# Patient Record
Sex: MALE | Race: WHITE | NOT HISPANIC OR LATINO | URBAN - METROPOLITAN AREA
[De-identification: names, ages, dates, MRNs, and addresses within clinical notes are randomized per-mention and may not be internally consistent; named-entity substitution may affect disease eponyms.]

---

## 2020-05-11 ENCOUNTER — APPOINTMENT (RX ONLY)
Dept: URBAN - METROPOLITAN AREA CLINIC 26 | Facility: CLINIC | Age: 20
Setting detail: DERMATOLOGY
End: 2020-05-11

## 2020-05-11 ENCOUNTER — APPOINTMENT (RX ONLY)
Dept: URBAN - METROPOLITAN AREA CLINIC 374 | Facility: CLINIC | Age: 20
Setting detail: DERMATOLOGY
End: 2020-05-11

## 2020-05-11 DIAGNOSIS — L70.0 ACNE VULGARIS: ICD-10-CM

## 2020-05-11 DIAGNOSIS — L663 OTHER SPECIFIED DISEASES OF HAIR AND HAIR FOLLICLES: ICD-10-CM | Status: RESOLVED

## 2020-05-11 DIAGNOSIS — B07.8 OTHER VIRAL WARTS: ICD-10-CM

## 2020-05-11 DIAGNOSIS — Q826 OTHER SPECIFIED ANOMALIES OF SKIN: ICD-10-CM

## 2020-05-11 DIAGNOSIS — Q819 OTHER SPECIFIED ANOMALIES OF SKIN: ICD-10-CM

## 2020-05-11 DIAGNOSIS — L738 OTHER SPECIFIED DISEASES OF HAIR AND HAIR FOLLICLES: ICD-10-CM | Status: RESOLVED

## 2020-05-11 DIAGNOSIS — L73.9 FOLLICULAR DISORDER, UNSPECIFIED: ICD-10-CM | Status: RESOLVED

## 2020-05-11 DIAGNOSIS — Q828 OTHER SPECIFIED ANOMALIES OF SKIN: ICD-10-CM

## 2020-05-11 PROBLEM — L02.828 FURUNCLE OF OTHER SITES: Status: ACTIVE | Noted: 2020-05-11

## 2020-05-11 PROBLEM — L85.8 OTHER SPECIFIED EPIDERMAL THICKENING: Status: ACTIVE | Noted: 2020-05-11

## 2020-05-11 PROCEDURE — 17110 DESTRUCTION B9 LES UP TO 14: CPT

## 2020-05-11 PROCEDURE — ? OBSERVATION

## 2020-05-11 PROCEDURE — ? TREATMENT REGIMEN

## 2020-05-11 PROCEDURE — ? ADDITIONAL NOTES

## 2020-05-11 PROCEDURE — ? PRESCRIPTION

## 2020-05-11 PROCEDURE — ? COUNSELING

## 2020-05-11 PROCEDURE — 99202 OFFICE O/P NEW SF 15 MIN: CPT | Mod: 25

## 2020-05-11 PROCEDURE — ? BENIGN DESTRUCTION

## 2020-05-11 RX ORDER — TRETIONIN 0.5 MG/G
CREAM TOPICAL
Qty: 1 | Refills: 3 | Status: ERX | COMMUNITY
Start: 2020-05-11

## 2020-05-11 RX ADMIN — TRETIONIN: 0.5 CREAM TOPICAL at 00:00

## 2020-05-11 ASSESSMENT — LOCATION ZONE DERM
LOCATION ZONE: PENIS
LOCATION ZONE: TOE
LOCATION ZONE: FEET
LOCATION ZONE: FACE

## 2020-05-11 ASSESSMENT — LOCATION DETAILED DESCRIPTION DERM
LOCATION DETAILED: LEFT PLANTAR FOREFOOT OVERLYING 1ST METATARSAL
LOCATION DETAILED: LEFT MEDIAL PLANTAR 2ND TOE
LOCATION DETAILED: LEFT PLANTAR FOREFOOT OVERLYING 4TH METATARSAL
LOCATION DETAILED: RIGHT DORSAL SHAFT OF PENIS
LOCATION DETAILED: RIGHT MEDIAL PLANTAR MIDFOOT
LOCATION DETAILED: LEFT INFERIOR CENTRAL MALAR CHEEK
LOCATION DETAILED: LEFT PLANTAR FOREFOOT OVERLYING 2ND METATARSAL
LOCATION DETAILED: LEFT LATERAL PLANTAR 1ST TOE
LOCATION DETAILED: RIGHT MEDIAL PLANTAR 2ND TOE

## 2020-05-11 ASSESSMENT — LOCATION SIMPLE DESCRIPTION DERM
LOCATION SIMPLE: PLANTAR SURFACE OF LEFT 2ND TOE
LOCATION SIMPLE: PENIS
LOCATION SIMPLE: RIGHT PLANTAR SURFACE
LOCATION SIMPLE: PLANTAR SURFACE OF RIGHT 2ND TOE
LOCATION SIMPLE: LEFT PLANTAR SURFACE
LOCATION SIMPLE: PLANTAR SURFACE OF LEFT 1ST TOE
LOCATION SIMPLE: LEFT CHEEK

## 2020-05-11 NOTE — HPI: SKIN LESION
Is This A New Presentation, Or A Follow-Up?: Skin Lesion
Additional History: Patient states that the lesion does not bother him, he believes it to be an ingrown hair and he is just concerned.

## 2020-05-11 NOTE — PROCEDURE: BENIGN DESTRUCTION
Add 52 Modifier (Optional): no
Medical Necessity Clause: This procedure was medically necessary because the lesions that were treated were:
Medical Necessity Information: It is in your best interest to select a reason for this procedure from the list below. All of these items fulfill various CMS LCD requirements except the new and changing color options.
Anesthesia Volume In Cc: 0.5
Detail Level: Detailed
Post-Care Instructions: I reviewed with the patient in detail post-care instructions. Patient is to wear sunprotection, and avoid picking at any of the treated lesions. Pt may apply Vaseline to crusted or scabbing areas.
Consent: The patient's consent was obtained including but not limited to risks of crusting, scabbing, blistering, scarring, darker or lighter pigmentary change, recurrence, incomplete removal and infection.
Treatment Number (Will Not Render If 0): 0

## 2020-05-11 NOTE — PROCEDURE: COUNSELING
Detail Level: Detailed
Tetracycline Pregnancy And Lactation Text: This medication is Pregnancy Category D and not consider safe during pregnancy. It is also excreted in breast milk.
Sarecycline Counseling: Patient advised regarding possible photosensitivity and discoloration of the teeth, skin, lips, tongue and gums.  Patient instructed to avoid sunlight, if possible.  When exposed to sunlight, patients should wear protective clothing, sunglasses, and sunscreen.  The patient was instructed to call the office immediately if the following severe adverse effects occur:  hearing changes, easy bruising/bleeding, severe headache, or vision changes.  The patient verbalized understanding of the proper use and possible adverse effects of sarecycline.  All of the patient's questions and concerns were addressed.
Detail Level: Zone
Topical Sulfur Applications Counseling: Topical Sulfur Counseling: Patient counseled that this medication may cause skin irritation or allergic reactions.  In the event of skin irritation, the patient was advised to reduce the amount of the drug applied or use it less frequently.   The patient verbalized understanding of the proper use and possible adverse effects of topical sulfur application.  All of the patient's questions and concerns were addressed.
Birth Control Pills Pregnancy And Lactation Text: This medication should be avoided if pregnant and for the first 30 days post-partum.
High Dose Vitamin A Counseling: Side effects reviewed, pt to contact office should one occur.
Topical Clindamycin Pregnancy And Lactation Text: This medication is Pregnancy Category B and is considered safe during pregnancy. It is unknown if it is excreted in breast milk.
Azithromycin Counseling:  I discussed with the patient the risks of azithromycin including but not limited to GI upset, allergic reaction, drug rash, diarrhea, and yeast infections.
Doxycycline Pregnancy And Lactation Text: This medication is Pregnancy Category D and not consider safe during pregnancy. It is also excreted in breast milk but is considered safe for shorter treatment courses.
Topical Retinoid Pregnancy And Lactation Text: This medication is Pregnancy Category C. It is unknown if this medication is excreted in breast milk.
High Dose Vitamin A Pregnancy And Lactation Text: High dose vitamin A therapy is contraindicated during pregnancy and breast feeding.
Topical Sulfur Applications Pregnancy And Lactation Text: This medication is Pregnancy Category C and has an unknown safety profile during pregnancy. It is unknown if this topical medication is excreted in breast milk.
Dapsone Counseling: I discussed with the patient the risks of dapsone including but not limited to hemolytic anemia, agranulocytosis, rashes, methemoglobinemia, kidney failure, peripheral neuropathy, headaches, GI upset, and liver toxicity.  Patients who start dapsone require monitoring including baseline LFTs and weekly CBCs for the first month, then every month thereafter.  The patient verbalized understanding of the proper use and possible adverse effects of dapsone.  All of the patient's questions and concerns were addressed.
Tazorac Counseling:  Patient advised that medication is irritating and drying.  Patient may need to apply sparingly and wash off after an hour before eventually leaving it on overnight.  The patient verbalized understanding of the proper use and possible adverse effects of tazorac.  All of the patient's questions and concerns were addressed.
Erythromycin Counseling:  I discussed with the patient the risks of erythromycin including but not limited to GI upset, allergic reaction, drug rash, diarrhea, increase in liver enzymes, and yeast infections.
Benzoyl Peroxide Counseling: Patient counseled that medicine may cause skin irritation and bleach clothing.  In the event of skin irritation, the patient was advised to reduce the amount of the drug applied or use it less frequently.   The patient verbalized understanding of the proper use and possible adverse effects of benzoyl peroxide.  All of the patient's questions and concerns were addressed.
Azithromycin Pregnancy And Lactation Text: This medication is considered safe during pregnancy and is also secreted in breast milk.
Benzoyl Peroxide Pregnancy And Lactation Text: This medication is Pregnancy Category C. It is unknown if benzoyl peroxide is excreted in breast milk.
Dapsone Pregnancy And Lactation Text: This medication is Pregnancy Category C and is not considered safe during pregnancy or breast feeding.
Bactrim Pregnancy And Lactation Text: This medication is Pregnancy Category D and is known to cause fetal risk.  It is also excreted in breast milk.
Spironolactone Pregnancy And Lactation Text: This medication can cause feminization of the male fetus and should be avoided during pregnancy. The active metabolite is also found in breast milk.
Topical Clindamycin Counseling: Patient counseled that this medication may cause skin irritation or allergic reactions.  In the event of skin irritation, the patient was advised to reduce the amount of the drug applied or use it less frequently.   The patient verbalized understanding of the proper use and possible adverse effects of clindamycin.  All of the patient's questions and concerns were addressed.
Spironolactone Counseling: Patient advised regarding risks of diarrhea, abdominal pain, hyperkalemia, birth defects (for female patients), liver toxicity and renal toxicity. The patient may need blood work to monitor liver and kidney function and potassium levels while on therapy. The patient verbalized understanding of the proper use and possible adverse effects of spironolactone.  All of the patient's questions and concerns were addressed.
Tazorac Pregnancy And Lactation Text: This medication is not safe during pregnancy. It is unknown if this medication is excreted in breast milk.
Bactrim Counseling:  I discussed with the patient the risks of sulfa antibiotics including but not limited to GI upset, allergic reaction, drug rash, diarrhea, dizziness, photosensitivity, and yeast infections.  Rarely, more serious reactions can occur including but not limited to aplastic anemia, agranulocytosis, methemoglobinemia, blood dyscrasias, liver or kidney failure, lung infiltrates or desquamative/blistering drug rashes.
Minocycline Counseling: Patient advised regarding possible photosensitivity and discoloration of the teeth, skin, lips, tongue and gums.  Patient instructed to avoid sunlight, if possible.  When exposed to sunlight, patients should wear protective clothing, sunglasses, and sunscreen.  The patient was instructed to call the office immediately if the following severe adverse effects occur:  hearing changes, easy bruising/bleeding, severe headache, or vision changes.  The patient verbalized understanding of the proper use and possible adverse effects of minocycline.  All of the patient's questions and concerns were addressed.
Erythromycin Pregnancy And Lactation Text: This medication is Pregnancy Category B and is considered safe during pregnancy. It is also excreted in breast milk.
Birth Control Pills Counseling: Birth Control Pill Counseling: I discussed with the patient the potential side effects of OCPs including but not limited to increased risk of stroke, heart attack, thrombophlebitis, deep venous thrombosis, hepatic adenomas, breast changes, GI upset, headaches, and depression.  The patient verbalized understanding of the proper use and possible adverse effects of OCPs. All of the patient's questions and concerns were addressed.
Topical Retinoid counseling:  Patient advised to apply a pea-sized amount only at bedtime and wait 30 minutes after washing their face before applying.  If too drying, patient may add a non-comedogenic moisturizer. The patient verbalized understanding of the proper use and possible adverse effects of retinoids.  All of the patient's questions and concerns were addressed.
Isotretinoin Pregnancy And Lactation Text: This medication is Pregnancy Category X and is considered extremely dangerous during pregnancy. It is unknown if it is excreted in breast milk.
Tetracycline Counseling: Patient counseled regarding possible photosensitivity and increased risk for sunburn.  Patient instructed to avoid sunlight, if possible.  When exposed to sunlight, patients should wear protective clothing, sunglasses, and sunscreen.  The patient was instructed to call the office immediately if the following severe adverse effects occur:  hearing changes, easy bruising/bleeding, severe headache, or vision changes.  The patient verbalized understanding of the proper use and possible adverse effects of tetracycline.  All of the patient's questions and concerns were addressed. Patient understands to avoid pregnancy while on therapy due to potential birth defects.
Isotretinoin Counseling: Patient should get monthly blood tests, not donate blood, not drive at night if vision affected, not share medication, and not undergo elective surgery for 6 months after tx completed. Side effects reviewed, pt to contact office should one occur.
Doxycycline Counseling:  Patient counseled regarding possible photosensitivity and increased risk for sunburn.  Patient instructed to avoid sunlight, if possible.  When exposed to sunlight, patients should wear protective clothing, sunglasses, and sunscreen.  The patient was instructed to call the office immediately if the following severe adverse effects occur:  hearing changes, easy bruising/bleeding, severe headache, or vision changes.  The patient verbalized understanding of the proper use and possible adverse effects of doxycycline.  All of the patient's questions and concerns were addressed.
Use Enhanced Medication Counseling?: No

## 2020-06-08 ENCOUNTER — APPOINTMENT (RX ONLY)
Dept: URBAN - METROPOLITAN AREA CLINIC 26 | Facility: CLINIC | Age: 20
Setting detail: DERMATOLOGY
End: 2020-06-08

## 2020-06-08 DIAGNOSIS — B07.8 OTHER VIRAL WARTS: ICD-10-CM | Status: IMPROVED

## 2020-06-08 PROCEDURE — ? BENIGN DESTRUCTION

## 2020-06-08 PROCEDURE — 17110 DESTRUCTION B9 LES UP TO 14: CPT

## 2020-06-08 PROCEDURE — ? ADDITIONAL NOTES

## 2020-06-08 PROCEDURE — ? COUNSELING

## 2020-06-08 ASSESSMENT — LOCATION SIMPLE DESCRIPTION DERM
LOCATION SIMPLE: RIGHT PLANTAR SURFACE
LOCATION SIMPLE: LEFT PLANTAR SURFACE
LOCATION SIMPLE: PLANTAR SURFACE OF LEFT 2ND TOE
LOCATION SIMPLE: PLANTAR SURFACE OF RIGHT 2ND TOE

## 2020-06-08 ASSESSMENT — LOCATION DETAILED DESCRIPTION DERM
LOCATION DETAILED: LEFT MEDIAL PLANTAR 2ND TOE
LOCATION DETAILED: LEFT PLANTAR FOREFOOT OVERLYING 4TH METATARSAL
LOCATION DETAILED: RIGHT MEDIAL PLANTAR 2ND TOE
LOCATION DETAILED: RIGHT PLANTAR FOREFOOT OVERLYING 1ST METATARSAL
LOCATION DETAILED: LEFT PLANTAR FOREFOOT OVERLYING 2ND METATARSAL
LOCATION DETAILED: RIGHT PLANTAR FOREFOOT OVERLYING 2ND METATARSAL

## 2020-06-08 ASSESSMENT — LOCATION ZONE DERM
LOCATION ZONE: FEET
LOCATION ZONE: TOE

## 2020-06-08 NOTE — PROCEDURE: BENIGN DESTRUCTION
Detail Level: Detailed
Add 52 Modifier (Optional): no
Post-Care Instructions: I reviewed with the patient in detail post-care instructions. Patient is to wear sunprotection, and avoid picking at any of the treated lesions. Pt may apply Vaseline to crusted or scabbing areas.
Treatment Number (Will Not Render If 0): 0
Medical Necessity Information: It is in your best interest to select a reason for this procedure from the list below. All of these items fulfill various CMS LCD requirements except the new and changing color options.
Anesthesia Volume In Cc: 0.5
Medical Necessity Clause: This procedure was medically necessary because the lesions that were treated were:
Consent: The patient's consent was obtained including but not limited to risks of crusting, scabbing, blistering, scarring, darker or lighter pigmentary change, recurrence, incomplete removal and infection.

## 2020-07-27 ENCOUNTER — APPOINTMENT (RX ONLY)
Dept: URBAN - METROPOLITAN AREA CLINIC 26 | Facility: CLINIC | Age: 20
Setting detail: DERMATOLOGY
End: 2020-07-27

## 2020-07-27 DIAGNOSIS — B07.8 OTHER VIRAL WARTS: ICD-10-CM | Status: RESOLVING

## 2020-07-27 PROCEDURE — ? ADDITIONAL NOTES

## 2020-07-27 PROCEDURE — 17110 DESTRUCTION B9 LES UP TO 14: CPT

## 2020-07-27 PROCEDURE — ? COUNSELING

## 2020-07-27 PROCEDURE — ? BENIGN DESTRUCTION

## 2020-07-27 ASSESSMENT — LOCATION ZONE DERM: LOCATION ZONE: TOE

## 2020-07-27 ASSESSMENT — LOCATION SIMPLE DESCRIPTION DERM: LOCATION SIMPLE: PLANTAR SURFACE OF RIGHT 2ND TOE

## 2020-07-27 ASSESSMENT — LOCATION DETAILED DESCRIPTION DERM: LOCATION DETAILED: RIGHT LATERAL PLANTAR 2ND TOE

## 2020-07-27 NOTE — PROCEDURE: ADDITIONAL NOTES
Additional Notes: Patient consent was obtained to proceed with the visit and recommended plan of care after discussion of all risks and benefits, including the risks of COVID-19 exposure.
Detail Level: Simple
Additional Notes: All other verruca clear on exam. R 2nd toe less than 1 mm in size, patient will return for any recurrence, otherwise has completed treatment.

## 2020-07-27 NOTE — PROCEDURE: BENIGN DESTRUCTION
Post-Care Instructions: I reviewed with the patient in detail post-care instructions. Patient is to wear sunprotection, and avoid picking at any of the treated lesions. Pt may apply Vaseline to crusted or scabbing areas.
Render Post-Care Instructions In Note?: no
Consent: The patient's consent was obtained including but not limited to risks of crusting, scabbing, blistering, scarring, darker or lighter pigmentary change, recurrence, incomplete removal and infection.
Detail Level: Detailed
Anesthesia Volume In Cc: 0.5
Medical Necessity Information: It is in your best interest to select a reason for this procedure from the list below. All of these items fulfill various CMS LCD requirements except the new and changing color options.
Medical Necessity Clause: This procedure was medically necessary because the lesions that were treated were:
Treatment Number (Will Not Render If 0): 0

## 2024-02-02 ENCOUNTER — OFFICE VISIT (OUTPATIENT)
Dept: DERMATOLOGY | Facility: CLINIC | Age: 24
End: 2024-02-02
Payer: COMMERCIAL

## 2024-02-02 VITALS — BODY MASS INDEX: 26.11 KG/M2 | WEIGHT: 210 LBS | HEIGHT: 75 IN | TEMPERATURE: 98.4 F

## 2024-02-02 DIAGNOSIS — Z79.899 HIGH RISK MEDICATION USE: ICD-10-CM

## 2024-02-02 DIAGNOSIS — L70.0 ACNE VULGARIS: Primary | ICD-10-CM

## 2024-02-02 DIAGNOSIS — L73.0 ACNE SCARRING: ICD-10-CM

## 2024-02-02 PROCEDURE — 99204 OFFICE O/P NEW MOD 45 MIN: CPT | Performed by: DERMATOLOGY

## 2024-02-02 RX ORDER — ISOTRETINOIN 40 MG/1
40 CAPSULE ORAL DAILY
Qty: 30 CAPSULE | Refills: 0 | Status: SHIPPED | OUTPATIENT
Start: 2024-02-02 | End: 2024-02-02

## 2024-02-02 RX ORDER — DEXTROAMPHETAMINE SACCHARATE, AMPHETAMINE ASPARTATE, DEXTROAMPHETAMINE SULFATE AND AMPHETAMINE SULFATE 2.5; 2.5; 2.5; 2.5 MG/1; MG/1; MG/1; MG/1
10 TABLET ORAL
COMMUNITY

## 2024-02-02 RX ORDER — ISOTRETINOIN 40 MG/1
40 CAPSULE ORAL DAILY
Qty: 30 CAPSULE | Refills: 0 | Status: SHIPPED | OUTPATIENT
Start: 2024-02-02

## 2024-02-02 RX ORDER — ALBUTEROL SULFATE 90 UG/1
2 AEROSOL, METERED RESPIRATORY (INHALATION) EVERY 6 HOURS PRN
COMMUNITY

## 2024-02-02 NOTE — PATIENT INSTRUCTIONS
SYSTEMIC Strategies:      INITIATE ISOTRETINOIN:  MALE       Initiate Isotretinoin (MALE):  High-Risk Medication  Medication Monitoring       ADDITIONAL FAMILY/PERSONAL HISTORY:  Family history of Crohns or Ulcerative Colitis: YES  Family history of high cholesterol or high triglycerides: YES       REVIEW OF SYSTEMS (High-Risk Medication):  Sexually active / Trying to get pregnant:  No  Dry Skin: No  Dry Lips/Eyes: No  Conjunctivitis: No  Difficulty seeing at night / Issues with night vision: No  Focusing Problems: No  Rash: No  Nose Bleeds: No  Angular cheilitis (cracking in corner of lips): No  Headaches: No  Photosensitivity: No  Weight Loss: No  Muscle Aches/Stiffness: No  Abdominal pain: No  Diarrhea: No  Bloody stools: No  Fatigue: No  Mood Changes: No  Depression: No  Suicidal thoughts: No       COUNSELING:  Patient counseled and understands...:  Cannot donate blood while taking isotretinoin and for 1 month after completing therapy. Yes  Do not share medication with anyone. Yes  Avoid excessive protein / creatine intake during isotretinoin therapy. Yes  Avoid rigorous work-outs 1-2 days before any lab work, which can affect liver enzymes.  Yes  Avoid any alcohol intake during isotretinoin therapy. Yes  Should stop all other acne medications unless instructed by Dermatologist otherwise. Yes  Patients counseled that possible side effects discussed, including sun sensitivity, dry lips/eyes/skin, headaches, blurry vision (pseudotumor cerebri), muscle/joint aches, GI upset, bloody stools (“IBD” including Crohn’s/Ulcerative Colitis), jaundice, liver dysfunction, lipid abnormalities, bone marrow dysfunction, mood effects, thoughts of hurting oneself or others, and flaring of acne (acne fulminans/SAPPHO). Yes  Patient understands to report any side effects of mood swings or depression or suicidal thoughts and to stop isotretinoin with any such suspected occurrence. Yes  Patient understands to confirm counseling in  "[employer] computer system prior to picking up prescription from pharmacy. Yes       LAB MONITORING:  Date that labs were last obtained (results may be in \"Labs\" or \"Media\" sections): 8/25/2022  Were any lab results reviewed today?  YES  Any significant lab abnormalities or concerning trends:  No    What is the plan in terms of lab monitoring for NEXT MONTH'S visit?: ordering labs today   What labs are being ordered today?  OTHER:  AST, ALT, and triglycerides  Patient understands to have labs completed as requested by ordering Dermatologist:  Yes         PRESCRIPTION MANAGEMENT:    Patient's current weight (in KILOgrams): 95.3 KILOgrams    \"GOAL DOSE\" (usually ~125 mg x weight in kg but may change on patient-by-patient basis):  11,912.5 mg    Starting \"DAILY DOSE\":: 40 mg ONCE A DAY (equivalent to 40 mg a day)  Patient's i[employer] # has been added to today's isotretinoin prescription:  Yes        [employer] REGISTRATION:  Extensive discussion around side effects and possible adverse events discussed:   Yes  i[employer] handouts provided:   Yes  Email address:  [email or web address]  Full name of parent/legal guardian approving plan (type \"N/A\" if Not Applicable):  N/A  Signed consent scanned into patient's chart:  Yes  Patient preference for receiving messages from [employer]:  Email  Patient REGISTERED today in [employer] today:   Yes  iPL[employer]GE #:  [id number]  i[employer] # has been made as a BLUE sticky note for everyone to see:  Yes         FOLLOW-UP APPOINTMENTS:  Patient has been offered a \"VIRTUAL FOLLOW-UP\" with either the prescribing physician or any attending with a standing virtual clinic (Dr. Kan. Dr. Davis, Dr. Moya):  NO           [email or web address]   MEDICAL DECISION MAKING  Treatment Goal:  Resolution of the CHRONIC condition.       Chronic condition is NOT at treatment goal.  It is progressing along its expected course OR is poorly-controlled.       "

## 2024-02-02 NOTE — PROGRESS NOTES
"[facility] Dermatology Clinic Note     Patient Name: Davi Lewis  Encounter Date: 2/2/24     Have you been cared for by a [facility] Dermatologist in the last 3 years and, if so, which description applies to you?    NO.   I am considered a \"new\" patient and must complete all patient intake questions. I am MALE/not capable of bearing children.    REVIEW OF SYSTEMS:  Have you recently had or currently have any of the following? Recent fever or chills? No  Any non-healing wound? No   PAST MEDICAL HISTORY:  Have you personally ever had or currently have any of the following?  If \"YES,\" then please provide more detail. Skin cancer (such as Melanoma, Basal Cell Carcinoma, Squamous Cell Carcinoma?  No  Tuberculosis, HIV/AIDS, Hepatitis B or C: No  Radiation Treatment No   HISTORY OF IMMUNOSUPPRESSION:   Do you have a history of any of the following:  Systemic Immunosuppression such as Diabetes, Biologic or Immunotherapy, Chemotherapy, Organ Transplantation, Bone Marrow Transplantation?  No     Answering \"YES\" requires the addition of the dotphrase \"IMMUNOSUPPRESSED\" as the first diagnosis of the patient's visit.   FAMILY HISTORY:  Any \"first degree relatives\" (parent, brother, sister, or child) with the following?    Skin Cancer, Pancreatic or Other Cancer? No   PATIENT EXPERIENCE:    Do you want the Dermatologist to perform a COMPLETE skin exam today including a clinical examination under the \"bra and underwear\" areas?  NO  If necessary, do we have your permission to call and leave a detailed message on your Preferred Phone number that includes your specific medical information?  Yes      Not on File No current outpatient medications on file.          Whom besides the patient is providing clinical information about today's encounter?   NO ADDITIONAL HISTORIAN (patient alone provided history)    Physical Exam and Assessment/Plan by Diagnosis:      ACNE VULGARIS    Physical Exam:  Anatomic Locations Involved: Chest and " "Back  Global Assessment: SEVERE:  Entire face is involved, covered with comedones, numerous papules and/or pustules; several nodules and cysts may be present.  Scarring Present? Yes; Atrophic scarring:  MILD      Additional History of Present Condition:  Patient has tried doxycycline in the past and that cleared his skin but then his acne returned. Patient was previously on accutane with good improvement. Last on accutane when he was 14 or 15. Patient has also tried benzoyl peroxide and clindamycin lotion and adapalene without improvement. Patient reports pain with acne on his back.       TODAY'S PLAN:     PRESCRIPTION MANAGEMENT:  Several treatment options were discussed including topical retinoids and their side effects.     Skin Hygiene:      Wash affected areas (face, chest, and back) TWICE A DAY with a mild cleanser such as dove bar sensitive.  Use only mild cleansers (hypoallergenic and without fragrances) and fragrance free detergent (not \"unscented\" products which contain a masking agent); we discussed avoiding irritants/fragranced products.  Apply a good oil-free facial moisturizer AT LEAST TWO TIMES A DAY \" such as cerave or cetaphil.  Minimize the application of oils and cosmetics to the affected skin.  This includes HAIR PRODUCTS such as \"leave in\" conditioners.  Unless the product specifically states that it \"won't cause acne,\" \"won't clog pores,\" and/or \"is non-comedogenic\" then it may actually CAUSE acne.  If you smoke, STOP. Nicotine increases sebum retention and increased scale within the follicles, forming comedones (blackheads and whiteheads).  Abrasive treatments such as dermabrasion and spa facials may aggravate inflammatory acne.  Do NOT scratch or pick your acne bumps.  The evidence that diet directly affects acne remains weak.  However, diet does affect your overall health.  Eat plenty of fresh fruit and vegetables.  Avoid protein or amino acid supplements, particularly if they contain " leucine. Consider a low-glycemic, low-protein and low-dairy diet.  Be mindful that certain medications may cause of aggravate acne.  Make sure to tell your Dermatologist if you start a new prescription, nutritional supplement, and/or herbal remedy.      MORNING Topical Regimen:      NONE.      EVENING Topical Regimen:      NONE.      SYSTEMIC Strategies:      INITIATE ISOTRETINOIN:  MALE       Initiate Isotretinoin (MALE):  High-Risk Medication  Medication Monitoring       ADDITIONAL FAMILY/PERSONAL HISTORY:  Family history of Crohns or Ulcerative Colitis: YES  Family history of high cholesterol or high triglycerides: YES       REVIEW OF SYSTEMS (High-Risk Medication):  Sexually active / Trying to get pregnant:  No  Dry Skin: No  Dry Lips/Eyes: No  Conjunctivitis: No  Difficulty seeing at night / Issues with night vision: No  Focusing Problems: No  Rash: No  Nose Bleeds: No  Angular cheilitis (cracking in corner of lips): No  Headaches: No  Photosensitivity: No  Weight Loss: No  Muscle Aches/Stiffness: No  Abdominal pain: No  Diarrhea: No  Bloody stools: No  Fatigue: No  Mood Changes: No  Depression: No  Suicidal thoughts: No       COUNSELING:  Patient counseled and understands...:  Cannot donate blood while taking isotretinoin and for 1 month after completing therapy. Yes  Do not share medication with anyone. Yes  Avoid excessive protein / creatine intake during isotretinoin therapy. Yes  Avoid rigorous work-outs 1-2 days before any lab work, which can affect liver enzymes.  Yes  Avoid any alcohol intake during isotretinoin therapy. Yes  Should stop all other acne medications unless instructed by Dermatologist otherwise. Yes  Patients counseled that possible side effects discussed, including sun sensitivity, dry lips/eyes/skin, headaches, blurry vision (pseudotumor cerebri), muscle/joint aches, GI upset, bloody stools (“IBD” including Crohn’s/Ulcerative Colitis), jaundice, liver dysfunction, lipid abnormalities,  "bone marrow dysfunction, mood effects, thoughts of hurting oneself or others, and flaring of acne (acne fulminans/SAPPHO). Yes  Patient understands to report any side effects of mood swings or depression or suicidal thoughts and to stop isotretinoin with any such suspected occurrence. Yes  Patient understands to confirm counseling in iPLEDGE computer system prior to picking up prescription from pharmacy. Yes       LAB MONITORING:  Date that labs were last obtained (results may be in \"Labs\" or \"Media\" sections): 8/25/2022  Were any lab results reviewed today?  YES  Any significant lab abnormalities or concerning trends:  No    What is the plan in terms of lab monitoring for NEXT MONTH'S visit?: ordering labs today   What labs are being ordered today?  OTHER:  AST, ALT, and triglycerides  Patient understands to have labs completed as requested by ordering Dermatologist:  Yes         PRESCRIPTION MANAGEMENT:    Patient's current weight (in KILOgrams): 95.3 KILOgrams    \"GOAL DOSE\" (usually ~125 mg x weight in kg but may change on patient-by-patient basis):  11,912.5 mg    Starting \"DAILY DOSE\":: 40 mg ONCE A DAY (equivalent to 40 mg a day)  Patient's iPLEDGE # has been added to today's isotretinoin prescription:  Yes        iPLEDGE REGISTRATION:  Extensive discussion around side effects and possible adverse events discussed:   Yes  iPLEDGE handouts provided:   Yes  Email address:  [email or web address]  Full name of parent/legal guardian approving plan (type \"N/A\" if Not Applicable):  N/A  Signed consent scanned into patient's chart:  Yes  Patient preference for receiving messages from iPLEDGE:  Email  Patient REGISTERED today in iPLEDGE today:   Yes  iPLEDGE #:  [id number]  iPLEDGE # has been made as a BLUE sticky note for everyone to see:  Yes         FOLLOW-UP APPOINTMENTS:  Patient has been offered a \"VIRTUAL FOLLOW-UP\" with either the prescribing physician or any attending with a standing virtual clinic (Dr." Lucius. Dr. Davis, Dr. Moya):  YES           [email or web address]        Chronic condition is NOT at treatment goal.  It is progressing along its expected course OR is poorly-controlled.              Scribe Attestation      I,:  Selena Baez am acting as a scribe while in the presence of the attending physician.:       I,:  Maty Leon MD personally performed the services described in this documentation    as scribed in my presence.:

## 2024-02-08 LAB
ALT SERPL-CCNC: 28 IU/L (ref 0–44)
AST SERPL-CCNC: 24 IU/L (ref 0–40)
TRIGL SERPL-MCNC: 132 MG/DL (ref 0–149)

## 2024-03-05 ENCOUNTER — TELEMEDICINE (OUTPATIENT)
Dept: DERMATOLOGY | Facility: CLINIC | Age: 24
End: 2024-03-05
Payer: COMMERCIAL

## 2024-03-05 VITALS — WEIGHT: 205 LBS | BODY MASS INDEX: 25.49 KG/M2 | HEIGHT: 75 IN

## 2024-03-05 DIAGNOSIS — L70.0 ACNE VULGARIS: Primary | ICD-10-CM

## 2024-03-05 DIAGNOSIS — Z51.81 MEDICATION MONITORING ENCOUNTER: ICD-10-CM

## 2024-03-05 DIAGNOSIS — Z79.899 HIGH RISK MEDICATION USE: ICD-10-CM

## 2024-03-05 PROCEDURE — 99214 OFFICE O/P EST MOD 30 MIN: CPT | Performed by: STUDENT IN AN ORGANIZED HEALTH CARE EDUCATION/TRAINING PROGRAM

## 2024-03-05 RX ORDER — ISOTRETINOIN 40 MG/1
CAPSULE ORAL
Qty: 60 CAPSULE | Refills: 0 | Status: SHIPPED | OUTPATIENT
Start: 2024-03-05

## 2024-03-05 NOTE — PROGRESS NOTES
"[facility] Dermatology VIRTUAL Note     Patient Name: Davi Lewis  Encounter Date: 3/5/24     Have you been cared for by a [facility] Dermatologist in the last 3 years and, if so, which description applies to you?    Yes.  I have been here within the last 3 years, and my medical history has NOT changed since that time.  I am MALE/not capable of bearing children.    REVIEW OF SYSTEMS:  Have you recently had or currently have any of the following? No changes in my recent health.   PAST MEDICAL HISTORY:  Have you personally ever had or currently have any of the following?  If \"YES,\" then please provide more detail. No changes in my medical history.   HISTORY OF IMMUNOSUPPRESSION: Do you have a history of any of the following:  Systemic Immunosuppression such as Diabetes, Biologic or Immunotherapy, Chemotherapy, Organ Transplantation, Bone Marrow Transplantation?  No     Answering \"YES\" requires the addition of the dotphrase \"IMMUNOSUPPRESSED\" as the first diagnosis of the patient's visit.   FAMILY HISTORY:  Any \"first degree relatives\" (parent, brother, sister, or child) with the following?    No changes in my family's known health.   PATIENT EXPERIENCE:    Do you want the Dermatologist to perform a COMPLETE skin exam today including a clinical examination under the \"bra and underwear\" areas?  NO  If necessary, do we have your permission to call and leave a detailed message on your Preferred Phone number that includes your specific medical information?  Yes      Allergies   Allergen Reactions    Cephalosporins Anaphylaxis      Current Outpatient Medications:     albuterol (PROVENTIL HFA,VENTOLIN HFA) 90 mcg/act inhaler, Inhale 2 puffs every 6 (six) hours as needed for wheezing, Disp: , Rfl:     amphetamine-dextroamphetamine (ADDERALL) 10 mg tablet, Take 10 mg by mouth 2 (two) times a day, Disp: , Rfl:     ISOtretinoin (ACCUTANE) 40 MG capsule, Take 1 capsule (40 mg total) by mouth daily, Disp: 30 capsule, Rfl: 0    " "      Whom besides the patient is providing clinical information about today's encounter?   NO ADDITIONAL HISTORIAN (patient alone provided history)    Physical Exam and Assessment/Plan by Diagnosis:      ISOTRETINOIN USE  ACNE VULGARIS (\"COMMON ACNE\")    Physical Exam:  Anatomic Location Affected:   Face, chest, back  Morphological Description: Open and closed comedones, erythematous papules, pustules, nodules, erythema - improved from prior  Pertinent Positives:  Pertinent Negatives:    Additional History of Present Condition:      Ipledge number: [id number]       DURATION OF TREATMENT:30 day  CURRENT DOSE: 40 mg daily  WEIGHT: 93 kg  CUMULATIVE DOSE (MG):1,200              CUMULATIVE DOSE (MG/KG):1,200  GOAL DOSE (160 mg/kg): 14,880 mg      Adverse effects:  CHEILITIS: Yes - mild and tolerable  FACIAL DRYNESS: YES - mild and tolerable  EPISTAXIS: None  MOOD CHANGE: None  HEADACHES: None  VISUAL Sx: None  MUSCULOSKELETAL Sx: None  RASH: None    CLINICAL COURSE: Somewhat improved        LABS  Baseline labs WNL (CBC, AST/ALT, Triglycerides)  Repeat labs to be performed before next appointment after achieving peak dose WNL (AST/ALT, Triglycerides)  Additional laboratory testing is not warranted when these tests are WNL and no additional testing is recommended based on recent guidelines ([facility], Isotretinoin Laboratory Monitoring in Acne Treatment: A [place] Consensus Study). Additional studies have shown that most lab abnormalities in isotretinoin users arise by month two and this laboratory monitoring serves to identify rare cases of lab abnormalities that warrant cessation or reduction in treatment (PMID: 21196039)    DIAGNOSES:    Acne Vulgaris  High Risk Medication - on Accutane therapy   Xerosis (see below for patient education)    Assessment and Plan:    Acne vulgaris, on isotretinoin   Planned daily dose for next 30 days: 40 mg BID  - Confirmed patient status and counseling in iPLEDGE today  - Gentle " skin care and regular SPF use. Stop all other acne treatments while on isotretinoin   - Recommend daily emollient for lips   - Side effects including but not limited to serious allergic reaction, thrombosis, mood changes, skeletal hyperostosis, premature epiphyseal closure, IBD, bone and muscle pain, HA, hyperlipidemia, photosensitivity, hepatotoxicity, dry skin and eyes, hair loss, teratogenicity (pregnancy category X) risk for up to 1 month after stopping medicine, 20-30% risk of recurrence were reviewed.  - The patient understands not to donate blood, drink excessive amounts of alcohol, or share the medication with any individuals   Follow up in 1 month      Patients counseled:  Cannot donate blood while taking isotretinoin and for 1 month after completing therapy. YES  Do not share medication with anyone. YES  Report any side effects of mood swings or depression and stop medication upon occurrence. YES  Patient needs to confirm counseling in iPledge prior to picking up prescription. YES     Virtual Regular Visit    Verification of patient location:    Patient is located at Home in the following state in which I hold an active license [place]      Assessment/Plan:    Problem List Items Addressed This Visit    None  Visit Diagnoses       Acne vulgaris    -  Primary    High risk medication use        Medication monitoring encounter                     Reason for visit is   Chief Complaint   Patient presents with    Virtual Regular Visit          Encounter provider Ambrosio Kan MD    Provider located at DERMATOLOGY [address] [place] [address] [phone]      Recent Visits  No visits were found meeting these conditions.  Showing recent visits within past 7 days and meeting all other requirements  Today's Visits  Date Type Provider Dept   03/05/24 Telemedicine Ambrosio Kan MD [facility]   Showing today's visits and  "meeting all other requirements  Future Appointments  No visits were found meeting these conditions.  Showing future appointments within next 150 days and meeting all other requirements       The patient was identified by name and date of birth. Davi Lewis was informed that this is a telemedicine visit and that the visit is being conducted through the Epic Embedded platform. He agrees to proceed..  My office door was closed. No one else was in the room.  He acknowledged consent and understanding of privacy and security of the video platform. The patient has agreed to participate and understands they can discontinue the visit at any time.    Patient is aware this is a billable service.     Subjective  Davi Lewis is a 23 y.o. male presenting for accutane follow up .      HPI     Past Medical History:   Diagnosis Date    Asthma        History reviewed. No pertinent surgical history.    Current Outpatient Medications   Medication Sig Dispense Refill    albuterol (PROVENTIL HFA,VENTOLIN HFA) 90 mcg/act inhaler Inhale 2 puffs every 6 (six) hours as needed for wheezing      amphetamine-dextroamphetamine (ADDERALL) 10 mg tablet Take 10 mg by mouth 2 (two) times a day      ISOtretinoin (ACCUTANE) 40 MG capsule Take 1 capsule (40 mg total) by mouth daily 30 capsule 0     No current facility-administered medications for this visit.        Allergies   Allergen Reactions    Cephalosporins Anaphylaxis       Review of Systems    Video Exam    Vitals:    03/05/24 1321   Weight: 93 kg (205 lb)   Height: 6' 3\" (1.905 m)       Physical Exam     Visit Time  Total Visit Duration: 15 mins        "

## 2024-04-02 LAB
ALT SERPL-CCNC: 29 IU/L (ref 0–44)
AST SERPL-CCNC: 29 IU/L (ref 0–40)
TRIGL SERPL-MCNC: 141 MG/DL (ref 0–149)

## 2024-04-09 ENCOUNTER — TELEMEDICINE (OUTPATIENT)
Dept: DERMATOLOGY | Facility: CLINIC | Age: 24
End: 2024-04-09
Payer: COMMERCIAL

## 2024-04-09 DIAGNOSIS — Z79.899 HIGH RISK MEDICATION USE: Primary | ICD-10-CM

## 2024-04-09 DIAGNOSIS — L70.0 ACNE VULGARIS: ICD-10-CM

## 2024-04-09 DIAGNOSIS — Z51.81 MEDICATION MONITORING ENCOUNTER: ICD-10-CM

## 2024-04-09 PROCEDURE — 99214 OFFICE O/P EST MOD 30 MIN: CPT | Performed by: STUDENT IN AN ORGANIZED HEALTH CARE EDUCATION/TRAINING PROGRAM

## 2024-04-09 RX ORDER — ISOTRETINOIN 40 MG/1
CAPSULE ORAL
Qty: 60 CAPSULE | Refills: 0 | Status: SHIPPED | OUTPATIENT
Start: 2024-04-09

## 2024-04-09 NOTE — PROGRESS NOTES
"[facility] Note     Patient Name: Davi Lewis  Encounter Date: 4/9/2024     Have you been cared for by a [facility]ologist in the last 3 years and, if so, which description applies to you?    Yes.  I have been here within the last 3 years, and my medical history has NOT changed since that time.  I am FEMALE/of child-bearing potential.    REVIEW OF SYSTEMS:  Have you recently had or currently have any of the following? No changes in my recent health.   PAST MEDICAL HISTORY:  Have you personally ever had or currently have any of the following?  If \"YES,\" then please provide more detail. No changes in my medical history.   HISTORY OF IMMUNOSUPPRESSION: Do you have a history of any of the following:  Systemic Immunosuppression such as Diabetes, Biologic or Immunotherapy, Chemotherapy, Organ Transplantation, Bone Marrow Transplantation?  No     Answering \"YES\" requires the addition of the dotphrase \"IMMUNOSUPPRESSED\" as the first diagnosis of the patient's visit.   FAMILY HISTORY:  Any \"first degree relatives\" (parent, brother, sister, or child) with the following?    No changes in my family's known health.   PATIENT EXPERIENCE:    Do you want the Dermatologist to perform a COMPLETE skin exam today including a clinical examination under the \"bra and underwear\" areas?  NO  If necessary, do we have your permission to call and leave a detailed message on your Preferred Phone number that includes your specific medical information?  Yes      Allergies   Allergen Reactions    Cephalosporins Anaphylaxis      Current Outpatient Medications:     albuterol (PROVENTIL HFA,VENTOLIN HFA) 90 mcg/act inhaler, Inhale 2 puffs every 6 (six) hours as needed for wheezing, Disp: , Rfl:     amphetamine-dextroamphetamine (ADDERALL) 10 mg tablet, Take 10 mg by mouth 2 (two) times a day, Disp: , Rfl:     ISOtretinoin (ACCUTANE) 40 MG capsule, Take one capsule (40 mg) twice daily (80 mg daily) with fatty food., Disp: " "60 capsule, Rfl: 0          Whom besides the patient is providing clinical information about today's encounter?   NO ADDITIONAL HISTORIAN (patient alone provided history)    Physical Exam and Assessment/Plan by Diagnosis:      ISOTRETINOIN \"ACCUTANE\": MALE    Age:23 y.o.  Weight: 93 Kg  Pulse: Virtual   Blood Pressure: Virtual     Ipledge number:  [id number]         \"Goal Dose\" in mg (125 mg x weight in kg): 14,880 mg    Interim month  \"Daily Dose\" of Isotretinoin the patient has actually been taking since last visit (this is usually what was prescribed): 80 mg (40 mg twice a day)   \"Total # of Days\" patient took Isotretinoin since last visit (this is usually 30):  30 days  \"Total Monthly Dose\" in mg since last visit (this equals \"Daily Dose\" x \"Total # of Days\"): 2,400 mg    Cumulative dosage  \"Total cumulative Dose\" in mg (add the above \"Total Monthly Dose\" with \"Total Cumulative Dose\" from last visit: 3600 mg        Symptoms  Conjunctivitis: No  Night Blindness/ Issues with night vision: No  Focusing Problems: No  Dry Lips/Eyes: YES, Lips   Dry Skin: YES  Rash: YES - consistent with prior eczema that is currently mildly exacerbated   Nose Bleeds: No  Angular cheilitis (cracking in corner of lips): No  Headaches: No  Photosensitivity: No  Dry Anus: No  Depression: No  Mood Changes: No  Suicidal thoughts: No  Fatigue: No  Weight Loss: No  Muscle Pain/Stiffness: YES, Muscle stiffness ( Back stiffness) - notes present prior to accutane  Abdominal pain: No  Diarrhea: No  Bloody stools: No         Physical Exam  Psychiatric/Mood: Pleasant   Anatomic Location Affected:  Face, chest and back   Morphological Description:  Open/Closed Comedones:  Few (\"Mild\")  Inflammatory Papules/Pustules:  Few (\"Mild\")  Nodules:  Rare (\"Almost Clear\")  Scarring:  Rare (\"Almost Clear\")  Excoriations:  No evidence (\"Clear\")  Local Skin Redness/Erythema:  No evidence (\"Clear\")  Local Skin Dryness/Scaling:  Few (\"Mild\")  Local Skin " "Dyspigmentation:  Rare (\"Almost Clear\")  Pertinent Positives:  Pertinent Negatives:      Labs  Date of last labs: 4/1/2024  Completed: YES  Labs reviewed: within normal limits      Additional History of Present Condition:  He is on Accutane. Entering his 3rd month of treatment. He is currently prescribed Accutane 40 mg twice a day. He is progressing positively with only minor side effects        DIAGNOSES:    Acne Vulgaris  High Risk Medication  Medication Monitoring  Xerosis (see below for patient education)    Assessment and Plan:    Let us know if your back worsens.  To address the dry skin on his arms, we can prescribe Triamcinolone 0.1% ointment to apply twice a day for 14 days period. He wants to hold off.  Suggested If the dry skin (rash) worsens, to give us a call, and we can arrange a prescription. He wants to continue to manage it with Vaseline. He states that occasionally he uses hydrocortisone as needed.   Follow up in 4 weeks    Target Total Dose per KILOgram:  125 mg/KILOgram (this may change this on a patient-by-patient basis)  Planned daily dose for next 30 days: 80 mg   Please remember to add patient's \"Ipledge number\" to actual prescription):    Return to clinic in about 1 month, please review ipledge guidelines in terms of timing (see below for patient education)  Get labs 1-2 days before next appointment: No - no additional labs needed. Obtained labs after reaching goal dose.   Patient confirmed in iPledge: YES  Patients counseled:  Cannot donate blood while taking isotretinoin and for 1 month after completing therapy. YES  Do not share medication with anyone. YES  Possible side effects discussed, including sun sensitivity, dry lips/eyes/skin, headaches, blurry vision (pseudotumor cerebri), muscle/joint aches, GI upset, bloody stools (“IBD” including Crohn’s/Ulcerative Colitis), jaundice, liver dysfunction, lipid abnormalities, bone marrow dysfunction, mood effects, thoughts of hurting oneself or " others, and flaring of acne (acne fulminans/SAPPHO). YES  Report any side effects of mood swings or depression and stop medication upon occurrence. YES    Virtual Regular Visit    Verification of patient location:    Patient is located at Home in the following state in which I hold an active license [place]      Assessment/Plan:    Problem List Items Addressed This Visit    None  Visit Diagnoses       Acne vulgaris                     Reason for visit is   Chief Complaint   Patient presents with    Virtual Regular Visit          Encounter provider Ambrosio Kan MD    Provider located at [address] [place] [address] [phone]      Recent Visits  No visits were found meeting these conditions.  Showing recent visits within past 7 days and meeting all other requirements  Today's Visits  Date Type Provider Dept   04/09/24 Telemedicine Ambrosio Kan MD [facility]   Showing today's visits and meeting all other requirements  Future Appointments  No visits were found meeting these conditions.  Showing future appointments within next 150 days and meeting all other requirements       The patient was identified by name and date of birth. Davi Lewis was informed that this is a telemedicine visit and that the visit is being conducted through the Epic Embedded platform. He agrees to proceed..  My office door was closed. The patient was notified the following individuals were present in the room Paula.  He acknowledged consent and understanding of privacy and security of the video platform. The patient has agreed to participate and understands they can discontinue the visit at any time.    Patient is aware this is a billable service.     Subjective  Davi Lewis is a 23 y.o. male who has a follow up appointment for Accutane treatment.       HPI     Past Medical History:   Diagnosis Date    Asthma        History  reviewed. No pertinent surgical history.    Current Outpatient Medications   Medication Sig Dispense Refill    albuterol (PROVENTIL HFA,VENTOLIN HFA) 90 mcg/act inhaler Inhale 2 puffs every 6 (six) hours as needed for wheezing      amphetamine-dextroamphetamine (ADDERALL) 10 mg tablet Take 10 mg by mouth 2 (two) times a day      ISOtretinoin (ACCUTANE) 40 MG capsule Take one capsule (40 mg) twice daily (80 mg daily) with fatty food. 60 capsule 0     No current facility-administered medications for this visit.        Allergies   Allergen Reactions    Cephalosporins Anaphylaxis       Review of Systems    Video Exam    There were no vitals filed for this visit.    Physical Exam     Visit Time  Total Visit Duration: 10 minutes

## 2024-05-14 ENCOUNTER — TELEMEDICINE (OUTPATIENT)
Dept: DERMATOLOGY | Facility: CLINIC | Age: 24
End: 2024-05-14
Payer: COMMERCIAL

## 2024-05-14 ENCOUNTER — TELEPHONE (OUTPATIENT)
Age: 24
End: 2024-05-14

## 2024-05-14 VITALS — HEIGHT: 75 IN | BODY MASS INDEX: 26.73 KG/M2 | WEIGHT: 215 LBS

## 2024-05-14 DIAGNOSIS — L73.0 ACNE SCARRING: ICD-10-CM

## 2024-05-14 DIAGNOSIS — L70.0 ACNE VULGARIS: Primary | ICD-10-CM

## 2024-05-14 DIAGNOSIS — Z51.81 MEDICATION MONITORING ENCOUNTER: ICD-10-CM

## 2024-05-14 DIAGNOSIS — Z79.899 HIGH RISK MEDICATION USE: ICD-10-CM

## 2024-05-14 PROCEDURE — 99214 OFFICE O/P EST MOD 30 MIN: CPT | Performed by: STUDENT IN AN ORGANIZED HEALTH CARE EDUCATION/TRAINING PROGRAM

## 2024-05-14 RX ORDER — ISOTRETINOIN 40 MG/1
CAPSULE ORAL
Qty: 60 CAPSULE | Refills: 0 | Status: SHIPPED | OUTPATIENT
Start: 2024-05-14

## 2024-05-14 NOTE — PROGRESS NOTES
Virtual Regular Visit    Verification of patient location:    Patient is located at Home in the following state in which I hold an active license [place]      Assessment/Plan:    Problem List Items Addressed This Visit    None           Reason for visit is   Chief Complaint   Patient presents with    Virtual Regular Visit          Encounter provider Ambrosio Kan MD      Recent Visits  No visits were found meeting these conditions.  Showing recent visits within past 7 days and meeting all other requirements  Today's Visits  Date Type Provider Dept   05/14/24 Telemedicine Ambrosio Kan MD [facility]   Showing today's visits and meeting all other requirements  Future Appointments  No visits were found meeting these conditions.  Showing future appointments within next 150 days and meeting all other requirements       The patient was identified by name and date of birth. Davi Lewis was informed that this is a telemedicine visit and that the visit is being conducted through the Jascha platform. He agrees to proceed..  My office door was closed. No one else was in the room.  He acknowledged consent and understanding of privacy and security of the video platform. The patient has agreed to participate and understands they can discontinue the visit at any time.    Patient is aware this is a billable service.     Subjective  Davi Lewis is a 23 y.o. male  .      HPI     Past Medical History:   Diagnosis Date    Asthma        No past surgical history on file.    Current Outpatient Medications   Medication Sig Dispense Refill    albuterol (PROVENTIL HFA,VENTOLIN HFA) 90 mcg/act inhaler Inhale 2 puffs every 6 (six) hours as needed for wheezing      amphetamine-dextroamphetamine (ADDERALL) 10 mg tablet Take 10 mg by mouth 2 (two) times a day      ISOtretinoin (ACCUTANE) 40 MG capsule Take one capsule (40 mg) twice daily (80 mg daily) with fatty food. 60 capsule 0     No current  "facility-administered medications for this visit.        Allergies   Allergen Reactions    Cephalosporins Anaphylaxis       Review of Systems    Video Exam    There were no vitals filed for this visit.    Physical Exam     Visit Time  Total Visit Duration: 15      [facility] Note     Patient Name: Davi Lewis  Encounter Date: 5/14/24     Have you been cared for by a [facility]atologist in the last 3 years and, if so, which description applies to you?    Yes.  I have been here within the last 3 years, and my medical history has NOT changed since that time.  I am MALE/not capable of bearing children.    REVIEW OF SYSTEMS:  Have you recently had or currently have any of the following? No changes in my recent health.   PAST MEDICAL HISTORY:  Have you personally ever had or currently have any of the following?  If \"YES,\" then please provide more detail. No changes in my medical history.   HISTORY OF IMMUNOSUPPRESSION: Do you have a history of any of the following:  Systemic Immunosuppression such as Diabetes, Biologic or Immunotherapy, Chemotherapy, Organ Transplantation, Bone Marrow Transplantation?  No     Answering \"YES\" requires the addition of the dotphrase \"IMMUNOSUPPRESSED\" as the first diagnosis of the patient's visit.   FAMILY HISTORY:  Any \"first degree relatives\" (parent, brother, sister, or child) with the following?    No changes in my family's known health.   PATIENT EXPERIENCE:    Do you want the Dermatologist to perform a COMPLETE skin exam today including a clinical examination under the \"bra and underwear\" areas?  NO  If necessary, do we have your permission to call and leave a detailed message on your Preferred Phone number that includes your specific medical information?  Yes      Allergies   Allergen Reactions    Cephalosporins Anaphylaxis      Current Outpatient Medications:     albuterol (PROVENTIL HFA,VENTOLIN HFA) 90 mcg/act inhaler, Inhale 2 puffs every 6 (six) hours " "as needed for wheezing, Disp: , Rfl:     amphetamine-dextroamphetamine (ADDERALL) 10 mg tablet, Take 10 mg by mouth 2 (two) times a day, Disp: , Rfl:     ISOtretinoin (ACCUTANE) 40 MG capsule, Take one capsule (40 mg) twice daily (80 mg daily) with fatty food., Disp: 60 capsule, Rfl: 0          Whom besides the patient is providing clinical information about today's encounter?   NO ADDITIONAL HISTORIAN (patient alone provided history)    Physical Exam and Assessment/Plan by Diagnosis:  ISOTRETINOIN \"ACCUTANE\": MALE     Age:23 y.o.  Weight: 97.5 Kg  Pulse: Virtual   Blood Pressure: Virtual      Ipledge number:  [id number]            \"Goal Dose\" in mg (160 mg x weight in kg): 15,600 mg     Interim month  \"Daily Dose\" of Isotretinoin the patient has actually been taking since last visit (this is usually what was prescribed): 80 mg (40 mg twice a day)   \"Total # of Days\" patient took Isotretinoin since last visit (this is usually 30):  30  \"Total Monthly Dose\" in mg since last visit (this equals \"Daily Dose\" x \"Total # of Days\"): 2,400     Cumulative dosage  \"Total cumulative Dose\" in mg (add the above \"Total Monthly Dose\" with \"Total Cumulative Dose\" from last visit: 6000 mg           Symptoms  Conjunctivitis: No  Night Blindness/ Issues with night vision: No  Focusing Problems: No  Dry Lips/Eyes: YES, Lips   Dry Skin: YES  Rash: YES - consistent with prior eczema and improved from prior  Nose Bleeds: Yes - minor and not bothersome  Angular cheilitis (cracking in corner of lips): No  Headaches: No  Photosensitivity: No  Dry Anus: No  Depression: No  Mood Changes: No  Suicidal thoughts: No  Fatigue: No  Weight Loss: No  Muscle Pain/Stiffness: No  Abdominal pain: No  Diarrhea: No  Bloody stools: No                       Physical Exam  Psychiatric/Mood: Pleasant   Anatomic Location Affected:  Face, chest and back   Morphological Description:  Open/Closed Comedones:  Few (\"Mild\")  Inflammatory Papules/Pustules:  Few " "(\"Mild\")  Nodules:  Rare (\"Almost Clear\")  Scarring:  Rare (\"Almost Clear\")  Excoriations:  No evidence (\"Clear\")  Local Skin Redness/Erythema:  No evidence (\"Clear\")  Local Skin Dryness/Scaling:  Few (\"Mild\")  Local Skin Dyspigmentation:  Rare (\"Almost Clear\")  Pertinent Positives:  Pertinent Negatives:        Labs  Date of last labs: 4/1/2024  Completed: YES  Labs reviewed: within normal limits        Additional History of Present Condition:  He is on Accutane.  He is currently prescribed Accutane 40 mg twice a day. He is progressing positively with only minor side effects. Still with flares primarily on back; however overall improved.        DIAGNOSES:       Acne Vulgaris  High Risk Medication  Medication Monitoring  Xerosis (see below for patient education)     Assessment and Plan:     To address the dry skin on his arms, we can prescribe Triamcinolone 0.1% ointment to apply twice a day for 14 days period. He wants to hold off.  Suggested If the dry skin (rash) worsens, to give us a call, and we can arrange a prescription. He wants to continue to manage it with Vaseline. He states that occasionally he uses hydrocortisone as needed.   Follow up in 4 weeks     Target Total Dose per KILOgram:  160 mg/KILOgram (this may change this on a patient-by-patient basis)  Planned daily dose for next 30 days: 40 mg BID   Please remember to add patient's \"Ipledge number\" to actual prescription):    Return to clinic in about 1 month, please review ipledge guidelines in terms of timing (see below for patient education)  Get labs 1-2 days before next appointment: No - no additional labs needed. Obtained labs after reaching goal dose.   Patient confirmed in iPledge: YES  Patients counseled:  Cannot donate blood while taking isotretinoin and for 1 month after completing therapy. YES  Do not share medication with anyone. YES  Possible side effects discussed, including sun sensitivity, dry lips/eyes/skin, headaches, blurry vision " (pseudotumor cerebri), muscle/joint aches, GI upset, bloody stools (“IBD” including Crohn’s/Ulcerative Colitis), jaundice, liver dysfunction, lipid abnormalities, bone marrow dysfunction, mood effects, thoughts of hurting oneself or others, and flaring of acne (acne fulminans/SAPPHO). YES  Report any side effects of mood swings or depression and stop medication upon occurrence. YES     Scribe Attestation      I,:  Misty Malhotra MA am acting as a scribe while in the presence of the attending physician.:       I,:  Ambrosio Kan MD personally performed the services described in this documentation    as scribed in my presence.:

## 2024-05-14 NOTE — TELEPHONE ENCOUNTER
Patient called bc he is waiting for his virtual appt and the call keeps failing.  He said he received the link and is waiting. I notified office he is waiting for dr pation

## 2024-06-18 ENCOUNTER — TELEMEDICINE (OUTPATIENT)
Dept: DERMATOLOGY | Facility: CLINIC | Age: 24
End: 2024-06-18
Payer: COMMERCIAL

## 2024-06-18 VITALS — BODY MASS INDEX: 26.87 KG/M2 | WEIGHT: 215 LBS

## 2024-06-18 DIAGNOSIS — Z51.81 MEDICATION MONITORING ENCOUNTER: ICD-10-CM

## 2024-06-18 DIAGNOSIS — L70.0 ACNE VULGARIS: Primary | ICD-10-CM

## 2024-06-18 DIAGNOSIS — Z79.899 HIGH RISK MEDICATION USE: ICD-10-CM

## 2024-06-18 DIAGNOSIS — L73.0 ACNE SCARRING: ICD-10-CM

## 2024-06-18 PROCEDURE — 99214 OFFICE O/P EST MOD 30 MIN: CPT | Performed by: STUDENT IN AN ORGANIZED HEALTH CARE EDUCATION/TRAINING PROGRAM

## 2024-06-18 RX ORDER — ISOTRETINOIN 40 MG/1
CAPSULE ORAL
Qty: 60 CAPSULE | Refills: 0 | Status: SHIPPED | OUTPATIENT
Start: 2024-06-18

## 2024-06-18 NOTE — PROGRESS NOTES
Virtual Regular Visit    Verification of patient location:    Patient is located at Home in the following state in which I hold an active license [place]      Assessment/Plan:    Problem List Items Addressed This Visit    None  Visit Diagnoses       Acne vulgaris    -  Primary    High risk medication use        Medication monitoring encounter        Acne scarring                     Reason for visit is   Chief Complaint   Patient presents with    Virtual Regular Visit          Encounter provider Ambrosio Kan MD      Recent Visits  No visits were found meeting these conditions.  Showing recent visits within past 7 days and meeting all other requirements  Today's Visits  Date Type Provider Dept   06/18/24 Telemedicine Ambrosio Kan MD  Dermatology [facility]   Showing today's visits and meeting all other requirements  Future Appointments  No visits were found meeting these conditions.  Showing future appointments within next 150 days and meeting all other requirements       The patient was identified by name and date of birth. Davi Lewis was informed that this is a telemedicine visit and that the visit is being conducted through the Epic Embedded platform. He agrees to proceed..  My office door was closed. No one else was in the room.  He acknowledged consent and understanding of privacy and security of the video platform. The patient has agreed to participate and understands they can discontinue the visit at any time.    Patient is aware this is a billable service.     Subjective  Davi Lewis is a 23 y.o. male  .      HPI     Past Medical History:   Diagnosis Date    Acne     Asthma        History reviewed. No pertinent surgical history.    Current Outpatient Medications   Medication Sig Dispense Refill    albuterol (PROVENTIL HFA,VENTOLIN HFA) 90 mcg/act inhaler Inhale 2 puffs every 6 (six) hours as needed for wheezing      amphetamine-dextroamphetamine (ADDERALL) 10 mg tablet Take 10 mg by  "mouth 2 (two) times a day      ISOtretinoin (ACCUTANE) 40 MG capsule Take one capsule (40 mg) twice daily (80 mg daily) with fatty food. 60 capsule 0     No current facility-administered medications for this visit.        Allergies   Allergen Reactions    Cephalosporins Anaphylaxis       Review of Systems    Video Exam    Vitals:    06/18/24 1316   Weight: 97.5 kg (215 lb)       Physical Exam     Visit Time  Total Visit Duration: 15 mins        ISOTRETINOIN \"ACCUTANE\": MALE    Age:23 y.o.  Weight: 97.5 kg    Ipledge number:  [id number]       \"Goal Dose\" in mg (125 mg x weight in kg): 15,600  mg    Interim month  \"Daily Dose\" of Isotretinoin the patient has actually been taking since last visit (this is usually what was prescribed): 40 mg ( 40 mg twice daily =80 mg)  \"Total # of Days\" patient took Isotretinoin since last visit (this is usually 30):  30  days  \"Total Monthly Dose\" in mg since last visit (this equals \"Daily Dose\" x \"Total # of Days\"): 2,400 mg    Cumulative dosage  \"Total cumulative Dose\" in mg (add the above \"Total Monthly Dose\" with \"Total Cumulative Dose\" from last visit: 8,400 mg      Symptoms  Conjunctivitis: No  Night Blindness/ Issues with night vision: No  Focusing Problems: No  Dry Lips/Eyes: YES  Dry Skin: YES  Rash: YES, apply hydrocortisone with good control  Nose Bleeds: YES, sometimes but manageable  Angular cheilitis (cracking in corner of lips): No  Headaches: No  Photosensitivity: No  Dry Anus: No  Depression: No  Mood Changes: No  Suicidal thoughts: No  Fatigue: No  Weight Loss: No  Muscle Pain/Stiffness: YES, lower back pain that is mildly uncomfortable but does not impact day to day life  Abdominal pain: No  Diarrhea: No  Bloody stools: No      Note patient experiencing tinnitus. Only after a loud concert. There have been reports of tinnitus with accutane but given timing after concert think low concern. Patient to call if not improving.      Physical Exam  Psychiatric/Mood: " "normal  Anatomic Location Affected:  chest and back  Morphological Description:  Open/Closed Comedones:  Rare (\"Almost Clear\")  Inflammatory Papules/Pustules:  Rare (\"Almost Clear\")  Nodules:  No evidence (\"Clear\")  Scarring:  Rare (\"Almost Clear\")  Excoriations:  No evidence (\"Clear\")  Local Skin Redness/Erythema:  Rare (\"Almost Clear\")  Local Skin Dryness/Scaling:  Rare (\"Almost Clear\")  Local Skin Dyspigmentation:  Rare (\"Almost Clear\")  Pertinent Positives:  Pertinent Negatives:      Labs  Date of last labs: 4/1/24  Completed: YES  Labs reviewed: within normal limits. Obtained after 30 days at max dose.      DIAGNOSES:    Acne Vulgaris  High Risk Medication  Medication Monitoring  Xerosis (see below for patient education)    Assessment and Plan:  Target Total Dose per KILOgram:  125 mg/KILOgram (this may change this on a patient-by-patient basis)  Planned daily dose for next 30 days: 40 mg BID   Please remember to add patient's \"Ipledge number\" to actual prescription):    Return to clinic in about 1 month, please review ipledge guidelines in terms of timing (see below for patient education)  Get labs 1-2 days before next appointment: YES  Patient confirmed in iPledge: YES  Patients counseled:  Cannot donate blood while taking isotretinoin and for 1 month after completing therapy. YES  Do not share medication with anyone. YES  Possible side effects discussed, including sun sensitivity, dry lips/eyes/skin, headaches, blurry vision (pseudotumor cerebri), muscle/joint aches, GI upset, bloody stools (“IBD” including Crohn’s/Ulcerative Colitis), jaundice, liver dysfunction, lipid abnormalities, bone marrow dysfunction, mood effects, thoughts of hurting oneself or others, and flaring of acne (acne fulminans/SAPPHO). YES  Report any side effects of mood swings or depression and stop medication upon occurrence. YES      ORAL ISOTRETINOIN (used to be called the brand name “ACCUTANE”)  Isotretinoin, a derivative of vitamin " A, is a powerful drug with several significant potential side effects. It is reserved for acne which is severe or when other medications have not worked well enough.  It used to be sold under the brand name “Accutane” but now several versions exist.    Isotretinoin for Acne   Isotretinoin, a derivative of vitamin A, is a retinoid medication that is taken by mouth to treat severe nodular acne. Typically, it is used once other acne treatments have not worked, such as oral antibiotics. Isotretinoin is powerful drug with several significant potential side effects. It used to be sold under the brand name “Accutane” but now several versions exist. Usually isotretinoin is taken for 4 to 6 months, although the length of treatment can vary from person to person.  While most patient’s acne improves and may even clear with this medication, in 20% of patients acne can come back. This requires additional acne treatment or even a second cycle of isotretinoin.     How should I take isotretinoin?   Isotretinoin dosing is weight-based and should be taken exactly as prescribed.    If you miss a dose, skip that dose. Do not take 2 doses at the same time.    Take with food to help with absorption.  All instructions in the iPLEDGE program packet (www.[employer]) that was provided must be followed (see below for highlights).   You will get a 30 day supply of isotretinoin at a time. It cannot be automatically refilled.  Make certain that you have been given enough medication to last 30 days as pharmacists are unable to refill or make changes within a month.  You must return to your dermatologist every month to make sure you are not having any serious side effects from isotretinoin. For female patients, this visit will always include a monthly pregnancy test. Other laboratory studies, including liver function tests, cholesterol and triglycerides, must also be conducted before and during treatment.     What should I avoid while  taking isotretinoin?   Do not donate blood while take isotretinoin or until 1 months after coming off the medication.   Do not have cosmetic procedures to smooth your skin, including waxing, dermabrasion, or laser procedures, while taking this medication and for at least 6 months after you stop.    Do not share isotretinoin with any other people. It can cause birth defects and other serious health problems.   Do not use any other acne medications, including medicated washes, cleansers, creams or antibiotic pills during your treatment with isotretinoin unless expressly directed to by your dermatologist.     Initiating isotretinoin & the iPLEDGE Program   The iPLEDGE Program is a strict, government-required program to prevent females from becoming pregnant while on isotretinoin. All females and males must participate. Note: Your provider must follow this program and cannot change any of the requirements.   Before starting isotretinoin, your provider will talk to you about the safe use of this medication and you will need to sign consent forms in order to receive treatment.    If you fail to keep appointments, you will be unable to get your prescription filled.     For male patients and women of non-childbearing age: There is no waiting period. Once laboratory tests are done, treatment can start.   For more information, visit: [email or web address]    What are the possible side effects of isotretinoin? What should I do?   Most side effects from isotretinoin are mild and can be easily improved with simple remedies.  Others are more concerning.   Dry skin and eyes, chapped lips and dry nose that may lead to nosebleeds.    Dry Skin: Apply sensitive skin moisturizers to dry skin at least two times a day. You may need sunscreen (SPF 30) in the morning and to reapply when outside.    Dry Eyes: Use saline eye drops or artificial tears.    Dry Nose/Nosebleeds: Use saline nasal spray (ex. Ayr)  during the day and at bedtime. To stop nosebleeds, hold pressure.  If this does not work, call your dermatologist.    Chapped lips: apply petrolatum-based lip balms routinely. Avoid anything “medicated.” Contact your dermatologist for excessive dryness, cracks, tenderness or pain.   Increased blood fats and cholesterol (usually in patients with a personal or family history of cholesterol or triglyceride problems).    Vision problems affecting your ability to see in the dark and drive at night.   Bone, muscle and tendon aches can occur. Additional stretching before and after activities may help relieve aches.  If you are otherwise healthy, consider the use of ibuprofen or naproxen.  If you are unsure if you can use these pain medications, ask your doctor first. Also, call your doctor if you experience severe back pain, joint pain, or a broken bone   Changes in mood, including anxiety, depressive symptoms or suicidal thoughts which may or may not be temporary.  Notify your doctor if your or a family member have suffered from these conditions or if you have any concerns during treatment.   Serious birth defects or miscarriage can occur while taking this medication and for one month after taking your last dose of isotretinoin. You must not get pregnant while taking isotretinoin. Once the medication is out of your system, 30 days, there is no effect on the baby.   Increased pressure in the brain. Call your doctor right away if you experience bad headache, blurred vision, dizziness, nausea or vomiting, or seizures.   Skin rash - call your doctor right away if you develop any rashes or blisters on the skin.   Liver damage - call your doctor right away if you experience severe stomach, chest or bowel pain, painful swallowing, diarrhea, blood in your stool, yellowing of your skin or eyes, or dark urine.   Gastrointestinal bleeding. If you experience unusual abdominal pain or red or black/tarry stools, call your doctor  "immediately. You should also notify your doctor if you or a family member has a history of ulcerative colitis or Crohns disease.   Worsening acne. Mild worsening of acne can occur in the first few weeks of using isotreinoin. If your acne is getting significantly worse, call your doctor. This may require temporarily stopping the isotretinoin and possibly adding other medications.           XEROSIS (\"DRY SKIN\")    Dry skin refers to skin that feels dry to touch. Dry skin has a dull surface with a rough, scaly quality. The skin is less pliable and cracked. When dryness is severe, the skin may become inflamed and fissured.  Although any body site can be dry, dry skin tends to affect the shins more than any other site.    Dry skin is lacking moisture in the outer horny cell layer (stratum corneum) and this results in cracks in the skin surface.  Dry skin is also called xerosis, xeroderma or asteatosis (lack of fat).  It can affect males and females of all ages. There is some racial variability in water and lipid content of the skin.  Dry skin that starts in early childhood may be one of about 20 types of ichthyosis (fish-scale skin). There is often a family history of dry skin.   Dry skin is commonly seen in people with atopic dermatitis.  Nearly everyone > 60 years has dry skin.    Dry skin that begins later may be seen in people with certain diseases and conditions.  Postmenopausal women  Hypothyroidism  Chronic renal disease   Malnutrition and weight loss   Subclinical dermatitis   Treatment with certain drugs such as oral retinoids, diuretics and epidermal growth factor receptor inhibitors    People exposed to a dry environment may experience dry skin.  Low humidity: in desert climates or cool, windy conditions   Excessive air conditioning   Direct heat from a fire or fan heater   Excessive bathing   Contact with soap, detergents and solvents   Inappropriate topical agents such as alcohol   Frictional irritation from " rough clothing or abrasives    Dry skin is due to abnormalities in the integrity of the barrier function of the stratum corneum, which is made up of corneocytes.  There is an overall reduction in the lipids in the stratum corneum.   Ratio of ceramides, cholesterol and free fatty acids may be normal or altered.   There may be a reduction in the proliferation of keratinocytes.   Keratinocyte subtypes change in dry skin with a decrease in keratins K1, K10 and increase in K5, K14.   Involucrin (a protein) may be expressed early, increasing cell stiffness.   The result is retention of corneocytes and reduced water-holding capacity.    What is the treatment for dry skin?  The mainstay of treatment of dry skin and ichthyosis is moisturisers/emollients. They should be applied liberally and often enough to:  Reduce itch   Improve the barrier function   Prevent entry of irritants, bacteria   Reduce transepidermal water loss.    When considering which emollient is most suitable, consider:  Severity of the dryness   Tolerance   Personal preference   Cost and availability.    Emollients generally work best if applied to damp skin, if pH is below 7 (acidic), and if containing humectants such as urea or propylene glycol.  Additional treatments include:  Topical steroid if itchy or there is dermatitis -- choose an emollient base   Topical calcineurin inhibitors if topical steroids are unsuitable.    How can dry skin be prevented?  Eliminate aggravating factors:  Reduce the frequency of bathing.   A humidifier in winter and air conditioner in summer   Compare having a short shower with a prolonged soak in a bath.   Use lukewarm, not hot, water.   Replace standard soap with a substitute such as a synthetic detergent cleanser, water-miscible emollient, bath oil, anti-pruritic tar oil, colloidal oatmeal etc.   Apply an emollient liberally and often, particularly shortly after bathing, and when itchy. The drier the skin, the thicker  this should be, especially on the hands.    What is the outlook for dry skin?  A tendency to dry skin may persist life-long, or it may improve once contributing factors are controlled.     Scribe Attestation      I,:  Akilah Magallanes am acting as a scribe while in the presence of the attending physician.:       I,:  Ambrosio Kan MD personally performed the services described in this documentation    as scribed in my presence.:

## 2024-07-23 ENCOUNTER — TELEMEDICINE (OUTPATIENT)
Dept: DERMATOLOGY | Facility: CLINIC | Age: 24
End: 2024-07-23
Payer: COMMERCIAL

## 2024-07-23 VITALS — HEIGHT: 75 IN | BODY MASS INDEX: 26.73 KG/M2 | WEIGHT: 215 LBS

## 2024-07-23 DIAGNOSIS — L70.0 ACNE VULGARIS: Primary | ICD-10-CM

## 2024-07-23 DIAGNOSIS — H93.19 TINNITUS, UNSPECIFIED LATERALITY: ICD-10-CM

## 2024-07-23 PROCEDURE — 99214 OFFICE O/P EST MOD 30 MIN: CPT | Performed by: STUDENT IN AN ORGANIZED HEALTH CARE EDUCATION/TRAINING PROGRAM

## 2024-07-23 RX ORDER — ISOTRETINOIN 40 MG/1
CAPSULE ORAL
Qty: 60 CAPSULE | Refills: 0 | Status: SHIPPED | OUTPATIENT
Start: 2024-07-23

## 2024-07-23 NOTE — PROGRESS NOTES
"[facility] Dermatology Clinic Note     Patient Name: Davi Lewis  Encounter Date: 07/23/24     Have you been cared for by a [facility] Dermatologist in the last 3 years and, if so, which description applies to you?    Yes.  I have been here within the last 3 years, and my medical history has NOT changed since that time.  I am MALE/not capable of bearing children.    REVIEW OF SYSTEMS:  Have you recently had or currently have any of the following? No changes in my recent health.   PAST MEDICAL HISTORY:  Have you personally ever had or currently have any of the following?  If \"YES,\" then please provide more detail. No changes in my medical history.   HISTORY OF IMMUNOSUPPRESSION: Do you have a history of any of the following:  Systemic Immunosuppression such as Diabetes, Biologic or Immunotherapy, Chemotherapy, Organ Transplantation, Bone Marrow Transplantation?  No     Answering \"YES\" requires the addition of the dotphrase \"IMMUNOSUPPRESSED\" as the first diagnosis of the patient's visit.   FAMILY HISTORY:  Any \"first degree relatives\" (parent, brother, sister, or child) with the following?    No changes in my family's known health.   PATIENT EXPERIENCE:    Do you want the Dermatologist to perform a COMPLETE skin exam today including a clinical examination under the \"bra and underwear\" areas?  NO  If necessary, do we have your permission to call and leave a detailed message on your Preferred Phone number that includes your specific medical information?  NO      Allergies   Allergen Reactions    Cephalosporins Anaphylaxis      Current Outpatient Medications:     albuterol (PROVENTIL HFA,VENTOLIN HFA) 90 mcg/act inhaler, Inhale 2 puffs every 6 (six) hours as needed for wheezing, Disp: , Rfl:     amphetamine-dextroamphetamine (ADDERALL) 10 mg tablet, Take 10 mg by mouth 2 (two) times a day, Disp: , Rfl:     ISOtretinoin (ACCUTANE) 40 MG capsule, Take one capsule (40 mg) twice daily (80 mg daily) with fatty food., " "Disp: 60 capsule, Rfl: 0          Whom besides the patient is providing clinical information about today's encounter?   NO ADDITIONAL HISTORIAN (patient alone provided history)    Physical Exam and Assessment/Plan by Diagnosis:       Virtual Regular Visit  Name: Davi Lewis      : 2000      MRN: [id number]  Encounter Provider: Ambrosio Kan MD  Encounter Date: 2024   Encounter department: [facility] DERMATOLOGY [place]    Verification of patient location:    Patient is located at Home in the following state in which I hold an active license [place]    Assessment & Plan         Encounter provider Ambrosio Kan MD    The patient was identified by name and date of birth. Davi Lewis was informed that this is a telemedicine visit and that the visit is being conducted through the [employer] platform. He agrees to proceed..  My office door was closed. No one else was in the room.  He acknowledged consent and understanding of privacy and security of the video platform. The patient has agreed to participate and understands they can discontinue the visit at any time.    Patient is aware this is a billable service.     History of Present Illness     Davi Lewis is a 23 y.o. male who presents for accutane folllow up    Review of Systems    Objective     Ht 6' 3\" (1.905 m)   Wt 97.5 kg (215 lb)   BMI 26.87 kg/m²   Physical Exam    Visit Time  Total Visit Duration: 15 mins    ISOTRETINOIN \"ACCUTANE\": MALE     Age:23 y.o.  Weight: 97.5 kg     Ipledge number:  [id number]         \"Goal Dose\" in mg (125 mg x weight in kg): 15,600  mg     Interim month  \"Daily Dose\" of Isotretinoin the patient has actually been taking since last visit (this is usually what was prescribed): 40 mg ( 40 mg twice daily =80 mg)  \"Total # of Days\" patient took Isotretinoin since last visit (this is usually 30):  30  days  \"Total Monthly Dose\" in mg since last visit (this equals \"Daily Dose\" x \"Total # of " "Days\"): 2,400 mg     Cumulative dosage  \"Total cumulative Dose\" in mg (add the above \"Total Monthly Dose\" with \"Total Cumulative Dose\" from last visit: 10,800 mg        Symptoms  Conjunctivitis: No  Night Blindness/ Issues with night vision: No  Focusing Problems: No  Dry Lips/Eyes: YES  Dry Skin: YES  Rash: YES on palms, apply hydrocortisone with good control  Nose Bleeds: YES, sometimes but manageable and very rare  Angular cheilitis (cracking in corner of lips): No  Headaches: No  Photosensitivity: No  Dry Anus: No  Depression: No  Mood Changes: No  Suicidal thoughts: No  Fatigue: No  Weight Loss: No  Muscle Pain/Stiffness: NO  Abdominal pain: No  Diarrhea: No  Bloody stools: No        Note patient experiencing tinnitus. Only after a loud concert. There have been reports of tinnitus with accutane but given timing after concert think low concern. Been persistent but not worsening. Low concern for accutane side effect but referring to ENT for evaluation. Literature suggests any hearing side effects from accutane are generally reversible on cessation; however will be thorough and cautious                   Physical Exam  Psychiatric/Mood: normal  Anatomic Location Affected:  chest and back  Morphological Description:  Open/Closed Comedones:  Rare (\"Almost Clear\")  Inflammatory Papules/Pustules:  Rare (\"Almost Clear\")  Nodules:  No evidence (\"Clear\")  Scarring:  Rare (\"Almost Clear\")  Excoriations:  No evidence (\"Clear\")  Local Skin Redness/Erythema:  Rare (\"Almost Clear\")  Local Skin Dryness/Scaling:  Rare (\"Almost Clear\")  Local Skin Dyspigmentation:  Rare (\"Almost Clear\")  Pertinent Positives:  Pertinent Negatives:        Labs  Date of last labs: 4/1/24  Completed: YES  Labs reviewed: within normal limits. Obtained after 30 days at max dose.        DIAGNOSES:       Acne Vulgaris  High Risk Medication  Medication Monitoring  Xerosis (see below for patient education)     Assessment and Plan:  Target Total Dose per " "KILOgram:  125 mg/KILOgram (this may change this on a patient-by-patient basis)  Planned daily dose for next 30 days: 40 mg BID   Please remember to add patient's \"Ipledge number\" to actual prescription):    Return to clinic in about 1 month, please review ipledge guidelines in terms of timing (see below for patient education)  Get labs 1-2 days before next appointment: NO  Patient confirmed in iPledge: YES  Patients counseled:  Cannot donate blood while taking isotretinoin and for 1 month after completing therapy. YES  Do not share medication with anyone. YES  Possible side effects discussed, including sun sensitivity, dry lips/eyes/skin, headaches, blurry vision (pseudotumor cerebri), muscle/joint aches, GI upset, bloody stools (“IBD” including Crohn’s/Ulcerative Colitis), jaundice, liver dysfunction, lipid abnormalities, bone marrow dysfunction, mood effects, thoughts of hurting oneself or others, and flaring of acne (acne fulminans/SAPPHO). YES  Report any side effects of mood swings or depression and stop medication upon occurrence. YES        ORAL ISOTRETINOIN (used to be called the brand name “ACCUTANE”)  Isotretinoin, a derivative of vitamin A, is a powerful drug with several significant potential side effects. It is reserved for acne which is severe or when other medications have not worked well enough.  It used to be sold under the brand name “Accutane” but now several versions exist.     Isotretinoin for Acne   Isotretinoin, a derivative of vitamin A, is a retinoid medication that is taken by mouth to treat severe nodular acne. Typically, it is used once other acne treatments have not worked, such as oral antibiotics. Isotretinoin is powerful drug with several significant potential side effects. It used to be sold under the brand name “Accutane” but now several versions exist. Usually isotretinoin is taken for 4 to 6 months, although the length of treatment can vary from person to person.  While most " patient’s acne improves and may even clear with this medication, in 20% of patients acne can come back. This requires additional acne treatment or even a second cycle of isotretinoin.      How should I take isotretinoin?   Isotretinoin dosing is weight-based and should be taken exactly as prescribed.    If you miss a dose, skip that dose. Do not take 2 doses at the same time.    Take with food to help with absorption.  All instructions in the iPLEDGE program packet (www.[employer]) that was provided must be followed (see below for highlights).   You will get a 30 day supply of isotretinoin at a time. It cannot be automatically refilled.  Make certain that you have been given enough medication to last 30 days as pharmacists are unable to refill or make changes within a month.  You must return to your dermatologist every month to make sure you are not having any serious side effects from isotretinoin. For female patients, this visit will always include a monthly pregnancy test. Other laboratory studies, including liver function tests, cholesterol and triglycerides, must also be conducted before and during treatment.      What should I avoid while taking isotretinoin?   Do not donate blood while take isotretinoin or until 1 months after coming off the medication.   Do not have cosmetic procedures to smooth your skin, including waxing, dermabrasion, or laser procedures, while taking this medication and for at least 6 months after you stop.    Do not share isotretinoin with any other people. It can cause birth defects and other serious health problems.   Do not use any other acne medications, including medicated washes, cleansers, creams or antibiotic pills during your treatment with isotretinoin unless expressly directed to by your dermatologist.      Initiating isotretinoin & the iPLEDGE Program   The iPLEDGE Program is a strict, government-required program to prevent females from becoming pregnant while on  isotretinoin. All females and males must participate. Note: Your provider must follow this program and cannot change any of the requirements.   Before starting isotretinoin, your provider will talk to you about the safe use of this medication and you will need to sign consent forms in order to receive treatment.    If you fail to keep appointments, you will be unable to get your prescription filled.     For male patients and women of non-childbearing age: There is no waiting period. Once laboratory tests are done, treatment can start.   For more information, visit: [email or web address]     What are the possible side effects of isotretinoin? What should I do?   Most side effects from isotretinoin are mild and can be easily improved with simple remedies.  Others are more concerning.   Dry skin and eyes, chapped lips and dry nose that may lead to nosebleeds.    Dry Skin: Apply sensitive skin moisturizers to dry skin at least two times a day. You may need sunscreen (SPF 30) in the morning and to reapply when outside.    Dry Eyes: Use saline eye drops or artificial tears.    Dry Nose/Nosebleeds: Use saline nasal spray (ex. Ayr) during the day and at bedtime. To stop nosebleeds, hold pressure.  If this does not work, call your dermatologist.    Chapped lips: apply petrolatum-based lip balms routinely. Avoid anything “medicated.” Contact your dermatologist for excessive dryness, cracks, tenderness or pain.   Increased blood fats and cholesterol (usually in patients with a personal or family history of cholesterol or triglyceride problems).    Vision problems affecting your ability to see in the dark and drive at night.   Bone, muscle and tendon aches can occur. Additional stretching before and after activities may help relieve aches.  If you are otherwise healthy, consider the use of ibuprofen or naproxen.  If you are unsure if you can use these pain medications, ask your doctor first.  "Also, call your doctor if you experience severe back pain, joint pain, or a broken bone   Changes in mood, including anxiety, depressive symptoms or suicidal thoughts which may or may not be temporary.  Notify your doctor if your or a family member have suffered from these conditions or if you have any concerns during treatment.   Serious birth defects or miscarriage can occur while taking this medication and for one month after taking your last dose of isotretinoin. You must not get pregnant while taking isotretinoin. Once the medication is out of your system, 30 days, there is no effect on the baby.   Increased pressure in the brain. Call your doctor right away if you experience bad headache, blurred vision, dizziness, nausea or vomiting, or seizures.   Skin rash - call your doctor right away if you develop any rashes or blisters on the skin.   Liver damage - call your doctor right away if you experience severe stomach, chest or bowel pain, painful swallowing, diarrhea, blood in your stool, yellowing of your skin or eyes, or dark urine.   Gastrointestinal bleeding. If you experience unusual abdominal pain or red or black/tarry stools, call your doctor immediately. You should also notify your doctor if you or a family member has a history of ulcerative colitis or Crohns disease.   Worsening acne. Mild worsening of acne can occur in the first few weeks of using isotreinoin. If your acne is getting significantly worse, call your doctor. This may require temporarily stopping the isotretinoin and possibly adding other medications.              XEROSIS (\"DRY SKIN\")     Dry skin refers to skin that feels dry to touch. Dry skin has a dull surface with a rough, scaly quality. The skin is less pliable and cracked. When dryness is severe, the skin may become inflamed and fissured.  Although any body site can be dry, dry skin tends to affect the shins more than any other site.     Dry skin is lacking moisture in the outer " horny cell layer (stratum corneum) and this results in cracks in the skin surface.  Dry skin is also called xerosis, xeroderma or asteatosis (lack of fat).  It can affect males and females of all ages. There is some racial variability in water and lipid content of the skin.  Dry skin that starts in early childhood may be one of about 20 types of ichthyosis (fish-scale skin). There is often a family history of dry skin.   Dry skin is commonly seen in people with atopic dermatitis.  Nearly everyone > 60 years has dry skin.     Dry skin that begins later may be seen in people with certain diseases and conditions.  Postmenopausal women  Hypothyroidism  Chronic renal disease   Malnutrition and weight loss   Subclinical dermatitis   Treatment with certain drugs such as oral retinoids, diuretics and epidermal growth factor receptor inhibitors     People exposed to a dry environment may experience dry skin.  Low humidity: in desert climates or cool, windy conditions   Excessive air conditioning   Direct heat from a fire or fan heater   Excessive bathing   Contact with soap, detergents and solvents   Inappropriate topical agents such as alcohol   Frictional irritation from rough clothing or abrasives     Dry skin is due to abnormalities in the integrity of the barrier function of the stratum corneum, which is made up of corneocytes.  There is an overall reduction in the lipids in the stratum corneum.   Ratio of ceramides, cholesterol and free fatty acids may be normal or altered.   There may be a reduction in the proliferation of keratinocytes.   Keratinocyte subtypes change in dry skin with a decrease in keratins K1, K10 and increase in K5, K14.   Involucrin (a protein) may be expressed early, increasing cell stiffness.   The result is retention of corneocytes and reduced water-holding capacity.     What is the treatment for dry skin?  The mainstay of treatment of dry skin and ichthyosis is moisturisers/emollients. They  should be applied liberally and often enough to:  Reduce itch   Improve the barrier function   Prevent entry of irritants, bacteria   Reduce transepidermal water loss.     When considering which emollient is most suitable, consider:  Severity of the dryness   Tolerance   Personal preference   Cost and availability.     Emollients generally work best if applied to damp skin, if pH is below 7 (acidic), and if containing humectants such as urea or propylene glycol.  Additional treatments include:  Topical steroid if itchy or there is dermatitis -- choose an emollient base   Topical calcineurin inhibitors if topical steroids are unsuitable.     How can dry skin be prevented?  Eliminate aggravating factors:  Reduce the frequency of bathing.   A humidifier in winter and air conditioner in summer   Compare having a short shower with a prolonged soak in a bath.   Use lukewarm, not hot, water.   Replace standard soap with a substitute such as a synthetic detergent cleanser, water-miscible emollient, bath oil, anti-pruritic tar oil, colloidal oatmeal etc.   Apply an emollient liberally and often, particularly shortly after bathing, and when itchy. The drier the skin, the thicker this should be, especially on the hands.     What is the outlook for dry skin?  A tendency to dry skin may persist life-long, or it may improve once contributing factors are controlled.

## 2024-08-27 ENCOUNTER — TELEMEDICINE (OUTPATIENT)
Dept: DERMATOLOGY | Facility: CLINIC | Age: 24
End: 2024-08-27
Payer: COMMERCIAL

## 2024-08-27 DIAGNOSIS — L70.0 ACNE VULGARIS: Primary | ICD-10-CM

## 2024-08-27 DIAGNOSIS — Z79.899 ON ISOTRETINOIN THERAPY: ICD-10-CM

## 2024-08-27 DIAGNOSIS — Z79.899 HIGH RISK MEDICATION USE: ICD-10-CM

## 2024-08-27 DIAGNOSIS — L85.3 XEROSIS OF SKIN: ICD-10-CM

## 2024-08-27 PROCEDURE — 99214 OFFICE O/P EST MOD 30 MIN: CPT | Performed by: STUDENT IN AN ORGANIZED HEALTH CARE EDUCATION/TRAINING PROGRAM

## 2024-08-27 RX ORDER — ISOTRETINOIN 40 MG/1
CAPSULE ORAL
Qty: 60 CAPSULE | Refills: 0 | Status: SHIPPED | OUTPATIENT
Start: 2024-08-27

## 2024-08-27 NOTE — PROGRESS NOTES
"Virtual Regular Visit  Name: Davi Lewis      : 2000      MRN: [id number]  Encounter Provider: Ambrosio Kan MD  Encounter Date: 2024   Encounter department: [facility] [place]    Verification of patient location:    Patient is located at Home in the following state in which I hold an active license [place]    Assessment & Plan         Encounter provider Ambrosio Kan MD    The patient was identified by name and date of birth. Davi Lewis was informed that this is a telemedicine visit and that the visit is being conducted through the [employer] platform. He agrees to proceed..  My office door was closed. No one else was in the room.  He acknowledged consent and understanding of privacy and security of the video platform. The patient has agreed to participate and understands they can discontinue the visit at any time.    Patient is aware this is a billable service.     History of Present Illness     Davi Lewis is a 23 y.o. male who presents for Accutane.    Review of Systems    Objective     There were no vitals taken for this visit.  Physical Exam    Visit Time  Total Visit Duration: 15 mins    [facility] Dermatology Clinic Note     Patient Name: Davi Lewis  Encounter Date: 2024     Have you been cared for by a [facility] Dermatologist in the last 3 years and, if so, which description applies to you?    Yes.  I have been here within the last 3 years, and my medical history has NOT changed since that time.  I am MALE/not capable of bearing children.    REVIEW OF SYSTEMS:  Have you recently had or currently have any of the following? No changes in my recent health.   PAST MEDICAL HISTORY:  Have you personally ever had or currently have any of the following?  If \"YES,\" then please provide more detail. No changes in my medical history.   HISTORY OF IMMUNOSUPPRESSION: Do you have a history of any of the following:  Systemic Immunosuppression such as " "Diabetes, Biologic or Immunotherapy, Chemotherapy, Organ Transplantation, Bone Marrow Transplantation or Prednisone?  No     Answering \"YES\" requires the addition of the dotphrase \"IMMUNOSUPPRESSED\" as the first diagnosis of the patient's visit.   FAMILY HISTORY:  Any \"first degree relatives\" (parent, brother, sister, or child) with the following?    No changes in my family's known health.   PATIENT EXPERIENCE:    Do you want the Dermatologist to perform a COMPLETE skin exam today including a clinical examination under the \"bra and underwear\" areas?  NO  If necessary, do we have your permission to call and leave a detailed message on your Preferred Phone number that includes your specific medical information?  Yes      Allergies   Allergen Reactions    Cephalosporins Anaphylaxis      Current Outpatient Medications:     albuterol (PROVENTIL HFA,VENTOLIN HFA) 90 mcg/act inhaler, Inhale 2 puffs every 6 (six) hours as needed for wheezing, Disp: , Rfl:     amphetamine-dextroamphetamine (ADDERALL) 10 mg tablet, Take 10 mg by mouth 2 (two) times a day, Disp: , Rfl:     ISOtretinoin (ACCUTANE) 40 MG capsule, Take one capsule (40 mg) twice daily (80 mg daily) with fatty food., Disp: 60 capsule, Rfl: 0          Whom besides the patient is providing clinical information about today's encounter?   NO ADDITIONAL HISTORIAN (patient alone provided history)    Physical Exam and Assessment/Plan by Diagnosis:    ISOTRETINOIN USE  ACNE VULGARIS (\"COMMON ACNE\")    Physical Exam:  Anatomic Location Affected:  Face, chest, back  Morphological Description: Previously with open and closed comedones, erythematous papules, pustules, nodules, erythema. Today largely clear. Rare erythematous papules per patient   Pertinent Positives:  Pertinent Negatives:    Additional History of Present Condition:      Ipledge number: [id number]    DURATION OF TREATMENT: 30 days  CURRENT DOSE: 40 mg BID  WEIGHT: 97.5 kg, starting weight 93 kg  CUMULATIVE DOSE " (MG): 2,400 mg              CUMULATIVE DOSE (MG/KG): 13,200 mg   GOAL DOSE (160 mg/kg):  15,600 mg, starting goal dose 14,880 mg    Symptoms  Conjunctivitis: No  Night Blindness/ Issues with night vision: No  Focusing Problems: No  Dry Lips/Eyes: YES, dry lips uses Aquaphor and dry eyes when windy  Dry Skin: YES, CeraVe  Rash: No, hands rash has resolved  Nose Bleeds: YES, when picking dry nasal discharge  Angular cheilitis (cracking in corner of lips): No  Headaches: No  Photosensitivity: YES, slightly but tolerable  Dry Anus: No  Depression: No  Mood Changes: No  Suicidal thoughts: No  Fatigue: No  Weight Loss: No  Muscle Pain/Stiffness: YES, stiffness in lower back. Improved with yoga  Abdominal pain: No  Diarrhea: No  Bloody stools: No    :  CLINICAL COURSE: Significant improvement. Rare inflammatory lesions. No cystic lesions. Previously with tinnitus that we were monitoring which has improved--attributed to loud concert given chronicity.         LABS  Date of last labs: 4/1/24  Completed: YES  Labs reviewed: within normal limits. Obtained after 30 days at max dose.      DIAGNOSES:    Acne Vulgaris  High Risk Medication - on Accutane therapy   Xerosis (see below for patient education)    Assessment and Plan:    Acne vulgaris, on isotretinoin   Planned daily dose for next 30 days: 40 mg BID  - [facility]  - Gentle skin care and regular SPF use. Stop all other acne treatments while on isotretinoin   - Recommend daily emollient for lips   - Side effects including but not limited to serious allergic reaction, thrombosis, mood changes, skeletal hyperostosis, premature epiphyseal closure, IBD, bone and muscle pain, HA, hyperlipidemia, photosensitivity, hepatotoxicity, dry skin and eyes, hair loss, teratogenicity (pregnancy category X) risk for up to 1 month after stopping medicine, 20-30% risk of recurrence were reviewed.  - The patient understands not to donate blood,  drink excessive amounts of alcohol, or share the medication with any individuals   Follow up in 1 month      Patients counseled:  Cannot donate blood while taking isotretinoin and for 1 month after completing therapy. YES  Do not share medication with anyone. YES  Report any side effects of mood swings or depression and stop medication upon occurrence. YES  Patient needs to confirm counseling in iPledge prior to picking up prescription. YES      Scribe Attestation      I,:  Ayah Hickman MA am acting as a scribe while in the presence of the attending physician.:       I,:  Ambrosio Kan MD personally performed the services described in this documentation    as scribed in my presence.:

## 2024-09-24 ENCOUNTER — TELEMEDICINE (OUTPATIENT)
Dept: DERMATOLOGY | Facility: CLINIC | Age: 24
End: 2024-09-24
Payer: COMMERCIAL

## 2024-09-24 DIAGNOSIS — L85.3 XEROSIS OF SKIN: ICD-10-CM

## 2024-09-24 DIAGNOSIS — L70.0 ACNE VULGARIS: Primary | ICD-10-CM

## 2024-09-24 DIAGNOSIS — Z79.899 ON ISOTRETINOIN THERAPY: ICD-10-CM

## 2024-09-24 DIAGNOSIS — Z79.899 HIGH RISK MEDICATION USE: ICD-10-CM

## 2024-09-24 PROCEDURE — 99213 OFFICE O/P EST LOW 20 MIN: CPT | Performed by: STUDENT IN AN ORGANIZED HEALTH CARE EDUCATION/TRAINING PROGRAM

## 2024-09-24 NOTE — PROGRESS NOTES
"Virtual Regular Visit  Name: Davi Lewis      : 2000      MRN: [id number]  Encounter Provider: Ambrosio Kan MD  Encounter Date: 2024   Encounter department: [facility] [place]    Verification of patient location:    Patient is located at Home in the following state in which I hold an active license [place]    Assessment & Plan        Encounter provider Ambrosio Kan MD    The patient was identified by name and date of birth. Davi Lewis was informed that this is a telemedicine visit and that the visit is being conducted through the Epic Embedded platform. He agrees to proceed..  My office door was closed. No one else was in the room.  He acknowledged consent and understanding of privacy and security of the video platform. The patient has agreed to participate and understands they can discontinue the visit at any time.    Patient is aware this is a billable service.     History of Present Illness     Davi Lewis is a 23 y.o. male who presents for Acne.    History obtained from : patient  Review of Systems        Objective     There were no vitals taken for this visit.  Physical Exam    Visit Time  Total Visit Duration: 10 mins    [facility] Note     Patient Name: Davi Lewis  Encounter Date: 2024     Have you been cared for by a [facility] Dermatologist in the last 3 years and, if so, which description applies to you?    Yes.  I have been here within the last 3 years, and my medical history has NOT changed since that time.  I am MALE/not capable of bearing children.    REVIEW OF SYSTEMS:  Have you recently had or currently have any of the following? No changes in my recent health.   PAST MEDICAL HISTORY:  Have you personally ever had or currently have any of the following?  If \"YES,\" then please provide more detail. No changes in my medical history.   HISTORY OF IMMUNOSUPPRESSION: Do you have a history of any of the following:  " "Systemic Immunosuppression such as Diabetes, Biologic or Immunotherapy, Chemotherapy, Organ Transplantation, Bone Marrow Transplantation or Prednisone?  No     Answering \"YES\" requires the addition of the dotphrase \"IMMUNOSUPPRESSED\" as the first diagnosis of the patient's visit.   FAMILY HISTORY:  Any \"first degree relatives\" (parent, brother, sister, or child) with the following?    No changes in my family's known health.   PATIENT EXPERIENCE:    Do you want the Dermatologist to perform a COMPLETE skin exam today including a clinical examination under the \"bra and underwear\" areas?  NO  If necessary, do we have your permission to call and leave a detailed message on your Preferred Phone number that includes your specific medical information?  Yes      Allergies   Allergen Reactions    Cephalosporins Anaphylaxis      Current Outpatient Medications:     albuterol (PROVENTIL HFA,VENTOLIN HFA) 90 mcg/act inhaler, Inhale 2 puffs every 6 (six) hours as needed for wheezing, Disp: , Rfl:     amphetamine-dextroamphetamine (ADDERALL) 10 mg tablet, Take 10 mg by mouth 2 (two) times a day, Disp: , Rfl:     ISOtretinoin (ACCUTANE) 40 MG capsule, Take one capsule (40 mg) twice daily (80 mg daily) with fatty food., Disp: 60 capsule, Rfl: 0          Whom besides the patient is providing clinical information about today's encounter?   NO ADDITIONAL HISTORIAN (patient alone provided history)    Physical Exam and Assessment/Plan by Diagnosis:    ISOTRETINOIN USE  ACNE VULGARIS (\"COMMON ACNE\")     Physical Exam:  Anatomic Location Affected:  Face, chest, back  Morphological Description: Previously with open and closed comedones, erythematous papules, pustules, nodules, erythema. Today  clear. Pertinent Negatives:     Additional History of Present Condition:       Ipledge number: [id number]     DURATION OF TREATMENT: 30 days  CURRENT DOSE: 40 mg BID  WEIGHT: 90.7 kg, starting weight 93 kg  CUMULATIVE DOSE (MG): 2,400 mg  CUMULATIVE " DOSE (MG/KG): 15,600 mg   GOAL DOSE (160 mg/kg):  15,600 mg, starting goal dose 14,880 mg     Symptoms  Conjunctivitis: No  Night Blindness/ Issues with night vision: No  Focusing Problems: No  Dry Lips/Eyes: YES, dry lips and dry eyes when windy  Dry Skin: YES  Rash: No  Nose Bleeds: YES, once in a while  Angular cheilitis (cracking in corner of lips): No  Headaches: No  Photosensitivity: YES, a little bit. No changes in vision and mild.  Dry Anus: No  Depression: No  Mood Changes: No  Suicidal thoughts: No  Fatigue: No  Weight Loss: No  Muscle Pain/Stiffness: YES, stiff back and shoulders  Abdominal pain: No  Diarrhea: No  Bloody stools: No       CLINICAL COURSE: Improved today with near total clearance (only 1 pimple rarely) for 2 months           LABS  Date of last labs: 4/1/24  Completed: YES  Labs reviewed: within normal limits. Obtained after 30 days at max dose.       DIAGNOSES:       Acne Vulgaris  High Risk Medication - on Accutane therapy   Xerosis (see below for patient education)     Assessment and Plan:     Acne vulgaris, on isotretinoin   Planned daily dose for next 30 days: 0 mg  - Deactivated patient in iPLEDGE today  - Gentle skin care and regular SPF use. Stop all other acne treatments while on isotretinoin   - Recommend daily emollient for lips   - Side effects including but not limited to serious allergic reaction, thrombosis, mood changes, skeletal hyperostosis, premature epiphyseal closure, IBD, bone and muscle pain, HA, hyperlipidemia, photosensitivity, hepatotoxicity, dry skin and eyes, hair loss, teratogenicity (pregnancy category X) risk for up to 1 month after stopping medicine, 20-30% risk of recurrence were reviewed.  - The patient understands not to donate blood, drink excessive amounts of alcohol, or share the medication with any individuals   Follow up in 2-4 month        Patients counseled:  Cannot donate blood while taking isotretinoin and for 1 month after completing therapy.  YES  Do not share medication with anyone. YES  Report any side effects of mood swings or depression and stop medication upon occurrence. YES        Scribe Attestation      I,:  Ayah Hickman MA am acting as a scribe while in the presence of the attending physician.:       I,:  Ambrosio Kan MD personally performed the services described in this documentation    as scribed in my presence.: